# Patient Record
Sex: FEMALE | Race: WHITE | Employment: PART TIME | URBAN - METROPOLITAN AREA
[De-identification: names, ages, dates, MRNs, and addresses within clinical notes are randomized per-mention and may not be internally consistent; named-entity substitution may affect disease eponyms.]

---

## 2017-01-19 DIAGNOSIS — F98.8 ADD (ATTENTION DEFICIT DISORDER): ICD-10-CM

## 2017-01-19 DIAGNOSIS — B00.1 COLD SORE: ICD-10-CM

## 2017-01-19 RX ORDER — VALACYCLOVIR HYDROCHLORIDE 1 G/1
TABLET, FILM COATED ORAL
Qty: 30 TABLET | Refills: 1 | Status: SHIPPED | OUTPATIENT
Start: 2017-01-19 | End: 2017-03-27 | Stop reason: SDUPTHER

## 2017-01-19 RX ORDER — DEXTROAMPHETAMINE SACCHARATE, AMPHETAMINE ASPARTATE MONOHYDRATE, DEXTROAMPHETAMINE SULFATE AND AMPHETAMINE SULFATE 7.5; 7.5; 7.5; 7.5 MG/1; MG/1; MG/1; MG/1
30 CAPSULE, EXTENDED RELEASE ORAL 2 TIMES DAILY
Qty: 60 CAPSULE | Refills: 0 | Status: SHIPPED | OUTPATIENT
Start: 2017-01-19 | End: 2017-02-20 | Stop reason: SDUPTHER

## 2017-02-20 DIAGNOSIS — F98.8 ADD (ATTENTION DEFICIT DISORDER): ICD-10-CM

## 2017-02-20 RX ORDER — DEXTROAMPHETAMINE SACCHARATE, AMPHETAMINE ASPARTATE MONOHYDRATE, DEXTROAMPHETAMINE SULFATE AND AMPHETAMINE SULFATE 7.5; 7.5; 7.5; 7.5 MG/1; MG/1; MG/1; MG/1
30 CAPSULE, EXTENDED RELEASE ORAL 2 TIMES DAILY
Qty: 60 CAPSULE | Refills: 0 | Status: SHIPPED | OUTPATIENT
Start: 2017-02-20 | End: 2017-03-21 | Stop reason: SDUPTHER

## 2017-03-21 DIAGNOSIS — F98.8 ADD (ATTENTION DEFICIT DISORDER): ICD-10-CM

## 2017-03-21 RX ORDER — DEXTROAMPHETAMINE SACCHARATE, AMPHETAMINE ASPARTATE MONOHYDRATE, DEXTROAMPHETAMINE SULFATE AND AMPHETAMINE SULFATE 7.5; 7.5; 7.5; 7.5 MG/1; MG/1; MG/1; MG/1
30 CAPSULE, EXTENDED RELEASE ORAL 2 TIMES DAILY
Qty: 60 CAPSULE | Refills: 0 | Status: SHIPPED | OUTPATIENT
Start: 2017-03-21 | End: 2017-04-21 | Stop reason: SDUPTHER

## 2017-03-27 ENCOUNTER — OFFICE VISIT (OUTPATIENT)
Dept: FAMILY MEDICINE CLINIC | Age: 37
End: 2017-03-27

## 2017-03-27 VITALS
DIASTOLIC BLOOD PRESSURE: 88 MMHG | RESPIRATION RATE: 16 BRPM | BODY MASS INDEX: 19.98 KG/M2 | SYSTOLIC BLOOD PRESSURE: 140 MMHG | WEIGHT: 105.8 LBS | HEIGHT: 61 IN | OXYGEN SATURATION: 97 % | HEART RATE: 103 BPM

## 2017-03-27 DIAGNOSIS — F41.9 ANXIETY: ICD-10-CM

## 2017-03-27 DIAGNOSIS — I10 ESSENTIAL HYPERTENSION: Primary | ICD-10-CM

## 2017-03-27 DIAGNOSIS — B00.1 COLD SORE: ICD-10-CM

## 2017-03-27 DIAGNOSIS — F98.8 ADD (ATTENTION DEFICIT DISORDER): ICD-10-CM

## 2017-03-27 PROCEDURE — 99213 OFFICE O/P EST LOW 20 MIN: CPT | Performed by: FAMILY MEDICINE

## 2017-03-27 RX ORDER — ALPRAZOLAM 1 MG/1
TABLET ORAL
Qty: 90 TABLET | Refills: 2 | Status: SHIPPED | OUTPATIENT
Start: 2017-03-27 | End: 2017-07-17 | Stop reason: SDUPTHER

## 2017-03-27 RX ORDER — VALACYCLOVIR HYDROCHLORIDE 1 G/1
TABLET, FILM COATED ORAL
Qty: 30 TABLET | Refills: 1 | Status: SHIPPED | OUTPATIENT
Start: 2017-03-27 | End: 2017-07-17 | Stop reason: SDUPTHER

## 2017-03-27 RX ORDER — HYDROCHLOROTHIAZIDE 12.5 MG/1
12.5 TABLET ORAL DAILY
Qty: 30 TABLET | Refills: 3 | Status: SHIPPED | OUTPATIENT
Start: 2017-03-27 | End: 2017-07-25 | Stop reason: SDUPTHER

## 2017-03-27 ASSESSMENT — ENCOUNTER SYMPTOMS
RESPIRATORY NEGATIVE: 1
GASTROINTESTINAL NEGATIVE: 1

## 2017-03-30 DIAGNOSIS — I10 ESSENTIAL HYPERTENSION: ICD-10-CM

## 2017-03-30 LAB
A/G RATIO: 1.7 (ref 1.1–2.2)
ALBUMIN SERPL-MCNC: 4.4 G/DL (ref 3.4–5)
ALP BLD-CCNC: 42 U/L (ref 40–129)
ALT SERPL-CCNC: 13 U/L (ref 10–40)
ANION GAP SERPL CALCULATED.3IONS-SCNC: 13 MMOL/L (ref 3–16)
AST SERPL-CCNC: 17 U/L (ref 15–37)
BASOPHILS ABSOLUTE: 0.1 K/UL (ref 0–0.2)
BASOPHILS RELATIVE PERCENT: 0.9 %
BILIRUB SERPL-MCNC: 0.5 MG/DL (ref 0–1)
BUN BLDV-MCNC: 28 MG/DL (ref 7–20)
CALCIUM SERPL-MCNC: 11 MG/DL (ref 8.3–10.6)
CHLORIDE BLD-SCNC: 98 MMOL/L (ref 99–110)
CHOLESTEROL, TOTAL: 176 MG/DL (ref 0–199)
CO2: 23 MMOL/L (ref 21–32)
CREAT SERPL-MCNC: 0.7 MG/DL (ref 0.6–1.1)
EOSINOPHILS ABSOLUTE: 0.2 K/UL (ref 0–0.6)
EOSINOPHILS RELATIVE PERCENT: 3.5 %
GFR AFRICAN AMERICAN: >60
GFR NON-AFRICAN AMERICAN: >60
GLOBULIN: 2.6 G/DL
GLUCOSE BLD-MCNC: 91 MG/DL (ref 70–99)
HCT VFR BLD CALC: 42.1 % (ref 36–48)
HDLC SERPL-MCNC: 109 MG/DL (ref 40–60)
HEMOGLOBIN: 13.9 G/DL (ref 12–16)
LDL CHOLESTEROL CALCULATED: 55 MG/DL
LYMPHOCYTES ABSOLUTE: 2 K/UL (ref 1–5.1)
LYMPHOCYTES RELATIVE PERCENT: 29.8 %
MCH RBC QN AUTO: 32.6 PG (ref 26–34)
MCHC RBC AUTO-ENTMCNC: 33 G/DL (ref 31–36)
MCV RBC AUTO: 98.8 FL (ref 80–100)
MONOCYTES ABSOLUTE: 0.8 K/UL (ref 0–1.3)
MONOCYTES RELATIVE PERCENT: 11.3 %
NEUTROPHILS ABSOLUTE: 3.6 K/UL (ref 1.7–7.7)
NEUTROPHILS RELATIVE PERCENT: 54.5 %
PDW BLD-RTO: 13.7 % (ref 12.4–15.4)
PLATELET # BLD: 252 K/UL (ref 135–450)
PMV BLD AUTO: 9.2 FL (ref 5–10.5)
POTASSIUM SERPL-SCNC: 4.6 MMOL/L (ref 3.5–5.1)
RBC # BLD: 4.26 M/UL (ref 4–5.2)
SODIUM BLD-SCNC: 134 MMOL/L (ref 136–145)
TOTAL PROTEIN: 7 G/DL (ref 6.4–8.2)
TRIGL SERPL-MCNC: 58 MG/DL (ref 0–150)
VLDLC SERPL CALC-MCNC: 12 MG/DL
WBC # BLD: 6.7 K/UL (ref 4–11)

## 2017-04-21 DIAGNOSIS — F98.8 ADD (ATTENTION DEFICIT DISORDER): ICD-10-CM

## 2017-04-21 RX ORDER — DEXTROAMPHETAMINE SACCHARATE, AMPHETAMINE ASPARTATE MONOHYDRATE, DEXTROAMPHETAMINE SULFATE AND AMPHETAMINE SULFATE 7.5; 7.5; 7.5; 7.5 MG/1; MG/1; MG/1; MG/1
30 CAPSULE, EXTENDED RELEASE ORAL 2 TIMES DAILY
Qty: 60 CAPSULE | Refills: 0 | Status: SHIPPED | OUTPATIENT
Start: 2017-04-21 | End: 2017-05-22 | Stop reason: SDUPTHER

## 2017-05-22 DIAGNOSIS — F98.8 ADD (ATTENTION DEFICIT DISORDER): ICD-10-CM

## 2017-05-22 RX ORDER — DEXTROAMPHETAMINE SACCHARATE, AMPHETAMINE ASPARTATE MONOHYDRATE, DEXTROAMPHETAMINE SULFATE AND AMPHETAMINE SULFATE 7.5; 7.5; 7.5; 7.5 MG/1; MG/1; MG/1; MG/1
30 CAPSULE, EXTENDED RELEASE ORAL 2 TIMES DAILY
Qty: 60 CAPSULE | Refills: 0 | Status: SHIPPED | OUTPATIENT
Start: 2017-05-22 | End: 2017-06-21 | Stop reason: SDUPTHER

## 2017-06-21 DIAGNOSIS — F98.8 ADD (ATTENTION DEFICIT DISORDER): ICD-10-CM

## 2017-06-21 RX ORDER — DEXTROAMPHETAMINE SACCHARATE, AMPHETAMINE ASPARTATE MONOHYDRATE, DEXTROAMPHETAMINE SULFATE AND AMPHETAMINE SULFATE 7.5; 7.5; 7.5; 7.5 MG/1; MG/1; MG/1; MG/1
30 CAPSULE, EXTENDED RELEASE ORAL 2 TIMES DAILY
Qty: 60 CAPSULE | Refills: 0 | Status: SHIPPED | OUTPATIENT
Start: 2017-06-21 | End: 2017-07-17 | Stop reason: SDUPTHER

## 2017-07-17 ENCOUNTER — OFFICE VISIT (OUTPATIENT)
Dept: FAMILY MEDICINE CLINIC | Age: 37
End: 2017-07-17

## 2017-07-17 VITALS
RESPIRATION RATE: 16 BRPM | DIASTOLIC BLOOD PRESSURE: 102 MMHG | WEIGHT: 106.4 LBS | OXYGEN SATURATION: 99 % | SYSTOLIC BLOOD PRESSURE: 166 MMHG | HEART RATE: 96 BPM | HEIGHT: 61 IN | BODY MASS INDEX: 20.09 KG/M2

## 2017-07-17 DIAGNOSIS — I10 ESSENTIAL HYPERTENSION: ICD-10-CM

## 2017-07-17 DIAGNOSIS — F98.8 ADD (ATTENTION DEFICIT DISORDER): ICD-10-CM

## 2017-07-17 DIAGNOSIS — B00.1 COLD SORE: ICD-10-CM

## 2017-07-17 DIAGNOSIS — F41.9 ANXIETY: Primary | ICD-10-CM

## 2017-07-17 PROCEDURE — 99214 OFFICE O/P EST MOD 30 MIN: CPT | Performed by: FAMILY MEDICINE

## 2017-07-17 RX ORDER — DEXTROAMPHETAMINE SACCHARATE, AMPHETAMINE ASPARTATE MONOHYDRATE, DEXTROAMPHETAMINE SULFATE AND AMPHETAMINE SULFATE 7.5; 7.5; 7.5; 7.5 MG/1; MG/1; MG/1; MG/1
30 CAPSULE, EXTENDED RELEASE ORAL 2 TIMES DAILY
Qty: 60 CAPSULE | Refills: 0 | Status: SHIPPED | OUTPATIENT
Start: 2017-07-17 | End: 2017-08-21 | Stop reason: SDUPTHER

## 2017-07-17 RX ORDER — VALACYCLOVIR HYDROCHLORIDE 1 G/1
TABLET, FILM COATED ORAL
Qty: 30 TABLET | Refills: 1 | Status: SHIPPED | OUTPATIENT
Start: 2017-07-17 | End: 2017-09-18 | Stop reason: SDUPTHER

## 2017-07-17 RX ORDER — ALPRAZOLAM 1 MG/1
TABLET ORAL
Qty: 90 TABLET | Refills: 0 | Status: SHIPPED | OUTPATIENT
Start: 2017-07-17 | End: 2017-09-18 | Stop reason: SDUPTHER

## 2017-07-17 RX ORDER — ESCITALOPRAM OXALATE 5 MG/1
5 TABLET ORAL DAILY
Qty: 30 TABLET | Refills: 3 | Status: SHIPPED | OUTPATIENT
Start: 2017-07-17 | End: 2017-09-18

## 2017-07-17 ASSESSMENT — PATIENT HEALTH QUESTIONNAIRE - PHQ9
2. FEELING DOWN, DEPRESSED OR HOPELESS: 1
1. LITTLE INTEREST OR PLEASURE IN DOING THINGS: 1
SUM OF ALL RESPONSES TO PHQ9 QUESTIONS 1 & 2: 2
SUM OF ALL RESPONSES TO PHQ QUESTIONS 1-9: 2

## 2017-07-25 DIAGNOSIS — I10 ESSENTIAL HYPERTENSION: ICD-10-CM

## 2017-07-25 RX ORDER — HYDROCHLOROTHIAZIDE 12.5 MG/1
TABLET ORAL
Qty: 30 TABLET | Refills: 3 | Status: SHIPPED | OUTPATIENT
Start: 2017-07-25 | End: 2017-12-11 | Stop reason: ALTCHOICE

## 2017-08-21 DIAGNOSIS — F98.8 ADD (ATTENTION DEFICIT DISORDER): ICD-10-CM

## 2017-08-22 RX ORDER — DEXTROAMPHETAMINE SACCHARATE, AMPHETAMINE ASPARTATE MONOHYDRATE, DEXTROAMPHETAMINE SULFATE AND AMPHETAMINE SULFATE 7.5; 7.5; 7.5; 7.5 MG/1; MG/1; MG/1; MG/1
30 CAPSULE, EXTENDED RELEASE ORAL 2 TIMES DAILY
Qty: 60 CAPSULE | Refills: 0 | Status: SHIPPED | OUTPATIENT
Start: 2017-08-22 | End: 2017-09-18 | Stop reason: SDUPTHER

## 2017-09-18 ENCOUNTER — OFFICE VISIT (OUTPATIENT)
Dept: FAMILY MEDICINE CLINIC | Age: 37
End: 2017-09-18

## 2017-09-18 VITALS
HEART RATE: 102 BPM | OXYGEN SATURATION: 97 % | BODY MASS INDEX: 20.44 KG/M2 | WEIGHT: 108.2 LBS | SYSTOLIC BLOOD PRESSURE: 140 MMHG | DIASTOLIC BLOOD PRESSURE: 98 MMHG

## 2017-09-18 DIAGNOSIS — B00.1 COLD SORE: ICD-10-CM

## 2017-09-18 DIAGNOSIS — F98.8 ADD (ATTENTION DEFICIT DISORDER): ICD-10-CM

## 2017-09-18 DIAGNOSIS — I10 ESSENTIAL HYPERTENSION: Primary | ICD-10-CM

## 2017-09-18 DIAGNOSIS — F41.9 ANXIETY: ICD-10-CM

## 2017-09-18 PROCEDURE — 99214 OFFICE O/P EST MOD 30 MIN: CPT | Performed by: FAMILY MEDICINE

## 2017-09-18 RX ORDER — VALACYCLOVIR HYDROCHLORIDE 1 G/1
TABLET, FILM COATED ORAL
Qty: 30 TABLET | Refills: 1 | Status: SHIPPED | OUTPATIENT
Start: 2017-09-18 | End: 2017-12-11 | Stop reason: SDUPTHER

## 2017-09-18 RX ORDER — DEXTROAMPHETAMINE SACCHARATE, AMPHETAMINE ASPARTATE MONOHYDRATE, DEXTROAMPHETAMINE SULFATE AND AMPHETAMINE SULFATE 7.5; 7.5; 7.5; 7.5 MG/1; MG/1; MG/1; MG/1
30 CAPSULE, EXTENDED RELEASE ORAL 2 TIMES DAILY
Qty: 60 CAPSULE | Refills: 0 | Status: SHIPPED | OUTPATIENT
Start: 2017-09-18 | End: 2017-10-16 | Stop reason: SDUPTHER

## 2017-09-18 RX ORDER — VENLAFAXINE HYDROCHLORIDE 75 MG/1
75 CAPSULE, EXTENDED RELEASE ORAL DAILY
Qty: 30 CAPSULE | Refills: 3 | Status: SHIPPED | OUTPATIENT
Start: 2017-09-18 | End: 2017-11-13 | Stop reason: SDUPTHER

## 2017-09-18 RX ORDER — AMLODIPINE BESYLATE 5 MG/1
5 TABLET ORAL DAILY
Qty: 30 TABLET | Refills: 3 | Status: SHIPPED | OUTPATIENT
Start: 2017-09-18 | End: 2017-12-11 | Stop reason: SDUPTHER

## 2017-09-18 RX ORDER — ALPRAZOLAM 1 MG/1
TABLET ORAL
Qty: 90 TABLET | Refills: 0 | Status: SHIPPED | OUTPATIENT
Start: 2017-09-18 | End: 2017-10-16 | Stop reason: SDUPTHER

## 2017-09-18 ASSESSMENT — ENCOUNTER SYMPTOMS
GASTROINTESTINAL NEGATIVE: 1
RESPIRATORY NEGATIVE: 1

## 2017-10-16 DIAGNOSIS — F41.9 ANXIETY: ICD-10-CM

## 2017-10-16 DIAGNOSIS — F98.8 ATTENTION DEFICIT DISORDER, UNSPECIFIED HYPERACTIVITY PRESENCE: ICD-10-CM

## 2017-10-16 RX ORDER — DEXTROAMPHETAMINE SACCHARATE, AMPHETAMINE ASPARTATE MONOHYDRATE, DEXTROAMPHETAMINE SULFATE AND AMPHETAMINE SULFATE 7.5; 7.5; 7.5; 7.5 MG/1; MG/1; MG/1; MG/1
30 CAPSULE, EXTENDED RELEASE ORAL 2 TIMES DAILY
Qty: 60 CAPSULE | Refills: 0 | Status: SHIPPED | OUTPATIENT
Start: 2017-10-16 | End: 2017-11-20 | Stop reason: SDUPTHER

## 2017-10-16 RX ORDER — ALPRAZOLAM 1 MG/1
TABLET ORAL
Qty: 90 TABLET | Refills: 0 | Status: SHIPPED | OUTPATIENT
Start: 2017-10-16 | End: 2017-12-11 | Stop reason: SDUPTHER

## 2017-10-25 ENCOUNTER — TELEPHONE (OUTPATIENT)
Dept: FAMILY MEDICINE CLINIC | Age: 37
End: 2017-10-25

## 2017-10-25 NOTE — TELEPHONE ENCOUNTER
Patient was prescribed [facility] ER tabs and told to call back if they don't help her. She  stated they are not helping. She would like to know if you would like to increase the dose or wait to her next appointment?  Please advise

## 2017-10-26 NOTE — TELEPHONE ENCOUNTER
LM asking patient to call the office back. If patient calls back please refer to Dr. Felix Mom message below.

## 2017-11-13 DIAGNOSIS — F41.9 ANXIETY: ICD-10-CM

## 2017-11-13 RX ORDER — VENLAFAXINE HYDROCHLORIDE 75 MG/1
75 CAPSULE, EXTENDED RELEASE ORAL DAILY
Qty: 30 CAPSULE | Refills: 5 | Status: SHIPPED | OUTPATIENT
Start: 2017-11-13 | End: 2017-11-21 | Stop reason: SDUPTHER

## 2017-11-20 DIAGNOSIS — F98.8 ATTENTION DEFICIT DISORDER, UNSPECIFIED HYPERACTIVITY PRESENCE: ICD-10-CM

## 2017-11-20 RX ORDER — DEXTROAMPHETAMINE SACCHARATE, AMPHETAMINE ASPARTATE MONOHYDRATE, DEXTROAMPHETAMINE SULFATE AND AMPHETAMINE SULFATE 7.5; 7.5; 7.5; 7.5 MG/1; MG/1; MG/1; MG/1
30 CAPSULE, EXTENDED RELEASE ORAL 2 TIMES DAILY
Qty: 60 CAPSULE | Refills: 0 | Status: SHIPPED | OUTPATIENT
Start: 2017-11-20 | End: 2017-12-11 | Stop reason: SDUPTHER

## 2017-11-20 NOTE — TELEPHONE ENCOUNTER
Medication and Quantity requested: amphetamine-dextroamphetamine (ADDERALL XR) 30 MG extended release capsule  Qty 60     Last Visit  9/18/17    Pharmacy and phone number updated in EPIC:  yes

## 2017-11-21 DIAGNOSIS — F41.9 ANXIETY: ICD-10-CM

## 2017-11-21 RX ORDER — VENLAFAXINE HYDROCHLORIDE 75 MG/1
150 CAPSULE, EXTENDED RELEASE ORAL DAILY
Qty: 60 CAPSULE | Refills: 5 | Status: SHIPPED | OUTPATIENT
Start: 2017-11-21 | End: 2018-05-22 | Stop reason: SDUPTHER

## 2017-11-21 NOTE — TELEPHONE ENCOUNTER
Medication and Quantity requested: Effexor 75 MG Taking Two by mouth daily per patient Dr. Maricel Hall changed her dosage and she is now out     Last Visit  9/18/17    Pharmacy and phone number updated in [facility]:  yes

## 2017-12-11 ENCOUNTER — OFFICE VISIT (OUTPATIENT)
Dept: FAMILY MEDICINE CLINIC | Age: 37
End: 2017-12-11

## 2017-12-11 VITALS
RESPIRATION RATE: 16 BRPM | HEART RATE: 104 BPM | WEIGHT: 111.8 LBS | BODY MASS INDEX: 21.11 KG/M2 | HEIGHT: 61 IN | SYSTOLIC BLOOD PRESSURE: 134 MMHG | OXYGEN SATURATION: 98 % | DIASTOLIC BLOOD PRESSURE: 94 MMHG

## 2017-12-11 DIAGNOSIS — F98.8 ATTENTION DEFICIT DISORDER, UNSPECIFIED HYPERACTIVITY PRESENCE: ICD-10-CM

## 2017-12-11 DIAGNOSIS — Z00.00 WELL ADULT EXAM: Primary | ICD-10-CM

## 2017-12-11 DIAGNOSIS — Z11.4 ENCOUNTER FOR SCREENING FOR HIV: ICD-10-CM

## 2017-12-11 DIAGNOSIS — I10 ESSENTIAL HYPERTENSION: ICD-10-CM

## 2017-12-11 DIAGNOSIS — N94.6 MENSTRUAL CRAMPS: ICD-10-CM

## 2017-12-11 DIAGNOSIS — B00.1 COLD SORE: ICD-10-CM

## 2017-12-11 DIAGNOSIS — Z23 NEED FOR TDAP VACCINATION: ICD-10-CM

## 2017-12-11 DIAGNOSIS — F41.9 ANXIETY: ICD-10-CM

## 2017-12-11 PROCEDURE — 99213 OFFICE O/P EST LOW 20 MIN: CPT | Performed by: FAMILY MEDICINE

## 2017-12-11 PROCEDURE — 90471 IMMUNIZATION ADMIN: CPT | Performed by: FAMILY MEDICINE

## 2017-12-11 PROCEDURE — 90715 TDAP VACCINE 7 YRS/> IM: CPT | Performed by: FAMILY MEDICINE

## 2017-12-11 RX ORDER — AMLODIPINE BESYLATE 5 MG/1
5 TABLET ORAL DAILY
Qty: 30 TABLET | Refills: 11 | Status: SHIPPED | OUTPATIENT
Start: 2017-12-11 | End: 2018-12-19 | Stop reason: SDUPTHER

## 2017-12-11 RX ORDER — IBUPROFEN 800 MG/1
TABLET ORAL
Qty: 30 TABLET | Refills: 3 | Status: SHIPPED | OUTPATIENT
Start: 2017-12-11 | End: 2018-12-28 | Stop reason: SDUPTHER

## 2017-12-11 RX ORDER — VALACYCLOVIR HYDROCHLORIDE 1 G/1
TABLET, FILM COATED ORAL
Qty: 30 TABLET | Refills: 2 | Status: SHIPPED | OUTPATIENT
Start: 2017-12-11 | End: 2018-08-27 | Stop reason: SDUPTHER

## 2017-12-11 RX ORDER — DEXTROAMPHETAMINE SACCHARATE, AMPHETAMINE ASPARTATE MONOHYDRATE, DEXTROAMPHETAMINE SULFATE AND AMPHETAMINE SULFATE 7.5; 7.5; 7.5; 7.5 MG/1; MG/1; MG/1; MG/1
30 CAPSULE, EXTENDED RELEASE ORAL 2 TIMES DAILY
Qty: 60 CAPSULE | Refills: 0 | Status: SHIPPED | OUTPATIENT
Start: 2017-12-11 | End: 2018-01-15 | Stop reason: SDUPTHER

## 2017-12-11 RX ORDER — ALPRAZOLAM 1 MG/1
TABLET ORAL
Qty: 90 TABLET | Refills: 0 | Status: SHIPPED | OUTPATIENT
Start: 2017-12-11 | End: 2018-03-29 | Stop reason: SDUPTHER

## 2017-12-11 RX ORDER — HYDROCHLOROTHIAZIDE 12.5 MG/1
12.5 TABLET ORAL DAILY
Qty: 30 TABLET | Refills: 3 | Status: SHIPPED | OUTPATIENT
Start: 2017-12-11 | End: 2018-04-21 | Stop reason: SDUPTHER

## 2017-12-11 ASSESSMENT — ENCOUNTER SYMPTOMS
RESPIRATORY NEGATIVE: 1
GASTROINTESTINAL NEGATIVE: 1

## 2018-01-15 DIAGNOSIS — F98.8 ATTENTION DEFICIT DISORDER, UNSPECIFIED HYPERACTIVITY PRESENCE: ICD-10-CM

## 2018-01-15 RX ORDER — DEXTROAMPHETAMINE SACCHARATE, AMPHETAMINE ASPARTATE MONOHYDRATE, DEXTROAMPHETAMINE SULFATE AND AMPHETAMINE SULFATE 7.5; 7.5; 7.5; 7.5 MG/1; MG/1; MG/1; MG/1
30 CAPSULE, EXTENDED RELEASE ORAL 2 TIMES DAILY
Qty: 60 CAPSULE | Refills: 0 | Status: SHIPPED | OUTPATIENT
Start: 2018-01-15 | End: 2018-02-15 | Stop reason: SDUPTHER

## 2018-02-15 DIAGNOSIS — F98.8 ATTENTION DEFICIT DISORDER, UNSPECIFIED HYPERACTIVITY PRESENCE: ICD-10-CM

## 2018-02-15 RX ORDER — DEXTROAMPHETAMINE SACCHARATE, AMPHETAMINE ASPARTATE MONOHYDRATE, DEXTROAMPHETAMINE SULFATE AND AMPHETAMINE SULFATE 7.5; 7.5; 7.5; 7.5 MG/1; MG/1; MG/1; MG/1
30 CAPSULE, EXTENDED RELEASE ORAL 2 TIMES DAILY
Qty: 60 CAPSULE | Refills: 0 | Status: SHIPPED | OUTPATIENT
Start: 2018-02-15 | End: 2018-03-15 | Stop reason: SDUPTHER

## 2018-03-15 DIAGNOSIS — F98.8 ATTENTION DEFICIT DISORDER, UNSPECIFIED HYPERACTIVITY PRESENCE: ICD-10-CM

## 2018-03-15 RX ORDER — DEXTROAMPHETAMINE SACCHARATE, AMPHETAMINE ASPARTATE MONOHYDRATE, DEXTROAMPHETAMINE SULFATE AND AMPHETAMINE SULFATE 7.5; 7.5; 7.5; 7.5 MG/1; MG/1; MG/1; MG/1
30 CAPSULE, EXTENDED RELEASE ORAL 2 TIMES DAILY
Qty: 60 CAPSULE | Refills: 0 | Status: SHIPPED | OUTPATIENT
Start: 2018-03-15 | End: 2018-04-23 | Stop reason: SDUPTHER

## 2018-03-29 ENCOUNTER — OFFICE VISIT (OUTPATIENT)
Dept: FAMILY MEDICINE CLINIC | Age: 38
End: 2018-03-29

## 2018-03-29 VITALS
HEART RATE: 110 BPM | RESPIRATION RATE: 14 BRPM | BODY MASS INDEX: 21.52 KG/M2 | SYSTOLIC BLOOD PRESSURE: 170 MMHG | HEIGHT: 61 IN | WEIGHT: 114 LBS | DIASTOLIC BLOOD PRESSURE: 102 MMHG | OXYGEN SATURATION: 98 %

## 2018-03-29 DIAGNOSIS — F98.8 ATTENTION DEFICIT DISORDER, UNSPECIFIED HYPERACTIVITY PRESENCE: ICD-10-CM

## 2018-03-29 DIAGNOSIS — F41.9 ANXIETY: ICD-10-CM

## 2018-03-29 DIAGNOSIS — I10 ESSENTIAL HYPERTENSION: Primary | ICD-10-CM

## 2018-03-29 PROCEDURE — 99213 OFFICE O/P EST LOW 20 MIN: CPT | Performed by: FAMILY MEDICINE

## 2018-03-29 RX ORDER — METOPROLOL SUCCINATE 25 MG/1
25 TABLET, EXTENDED RELEASE ORAL DAILY
Qty: 30 TABLET | Refills: 3 | Status: SHIPPED | OUTPATIENT
Start: 2018-03-29 | End: 2018-07-23 | Stop reason: SDUPTHER

## 2018-03-29 RX ORDER — ALPRAZOLAM 1 MG/1
TABLET ORAL
Qty: 90 TABLET | Refills: 0 | Status: SHIPPED | OUTPATIENT
Start: 2018-03-29 | End: 2018-08-27 | Stop reason: SDUPTHER

## 2018-03-29 ASSESSMENT — ENCOUNTER SYMPTOMS
RESPIRATORY NEGATIVE: 1
GASTROINTESTINAL NEGATIVE: 1

## 2018-03-29 NOTE — PROGRESS NOTES
here to calibrate. She should return in one month. I did advise her that metoprolol could cause some sluggishness. Anxiety  -     ALPRAZolam (XANAX) 1 MG tablet; TAKE ONE TABLET BY MOUTH THREE TIMES A DAY AS NEEDED FOR ANXIETY.   OARRS report reviewed and no inconsistencies noted   The patient understands the risks of dependency/addiction with alprazolam and will take as little as possible and discontinue as soon as possible       Attention deficit disorder, unspecified hyperactivity presence  OARRS report reviewed and no inconsistencies noted   The patient understands the risks of dependency/addiction with Adderall and will take as little as possible and discontinue as soon as possible

## 2018-04-21 DIAGNOSIS — I10 ESSENTIAL HYPERTENSION: ICD-10-CM

## 2018-04-23 DIAGNOSIS — F98.8 ATTENTION DEFICIT DISORDER, UNSPECIFIED HYPERACTIVITY PRESENCE: ICD-10-CM

## 2018-04-23 RX ORDER — HYDROCHLOROTHIAZIDE 12.5 MG/1
TABLET ORAL
Qty: 30 TABLET | Refills: 10 | Status: SHIPPED | OUTPATIENT
Start: 2018-04-23 | End: 2019-03-14 | Stop reason: SDUPTHER

## 2018-04-23 RX ORDER — DEXTROAMPHETAMINE SACCHARATE, AMPHETAMINE ASPARTATE MONOHYDRATE, DEXTROAMPHETAMINE SULFATE AND AMPHETAMINE SULFATE 7.5; 7.5; 7.5; 7.5 MG/1; MG/1; MG/1; MG/1
30 CAPSULE, EXTENDED RELEASE ORAL 2 TIMES DAILY
Qty: 60 CAPSULE | Refills: 0 | Status: SHIPPED | OUTPATIENT
Start: 2018-04-23 | End: 2018-05-21 | Stop reason: SDUPTHER

## 2018-05-21 DIAGNOSIS — F98.8 ATTENTION DEFICIT DISORDER, UNSPECIFIED HYPERACTIVITY PRESENCE: ICD-10-CM

## 2018-05-21 RX ORDER — DEXTROAMPHETAMINE SACCHARATE, AMPHETAMINE ASPARTATE MONOHYDRATE, DEXTROAMPHETAMINE SULFATE AND AMPHETAMINE SULFATE 7.5; 7.5; 7.5; 7.5 MG/1; MG/1; MG/1; MG/1
30 CAPSULE, EXTENDED RELEASE ORAL 2 TIMES DAILY
Qty: 60 CAPSULE | Refills: 0 | Status: SHIPPED | OUTPATIENT
Start: 2018-05-21 | End: 2018-06-25 | Stop reason: SDUPTHER

## 2018-05-22 DIAGNOSIS — F41.9 ANXIETY: ICD-10-CM

## 2018-05-23 RX ORDER — VENLAFAXINE HYDROCHLORIDE 75 MG/1
CAPSULE, EXTENDED RELEASE ORAL
Qty: 60 CAPSULE | Refills: 4 | Status: SHIPPED | OUTPATIENT
Start: 2018-05-23 | End: 2018-10-23 | Stop reason: SDUPTHER

## 2018-06-25 DIAGNOSIS — F98.8 ATTENTION DEFICIT DISORDER, UNSPECIFIED HYPERACTIVITY PRESENCE: ICD-10-CM

## 2018-06-25 RX ORDER — DEXTROAMPHETAMINE SACCHARATE, AMPHETAMINE ASPARTATE MONOHYDRATE, DEXTROAMPHETAMINE SULFATE AND AMPHETAMINE SULFATE 7.5; 7.5; 7.5; 7.5 MG/1; MG/1; MG/1; MG/1
30 CAPSULE, EXTENDED RELEASE ORAL 2 TIMES DAILY
Qty: 60 CAPSULE | Refills: 0 | Status: SHIPPED | OUTPATIENT
Start: 2018-06-25 | End: 2018-07-24 | Stop reason: SDUPTHER

## 2018-07-23 DIAGNOSIS — I10 ESSENTIAL HYPERTENSION: ICD-10-CM

## 2018-07-23 RX ORDER — METOPROLOL SUCCINATE 25 MG/1
TABLET, EXTENDED RELEASE ORAL
Qty: 90 TABLET | Refills: 2 | Status: SHIPPED | OUTPATIENT
Start: 2018-07-23 | End: 2019-04-17 | Stop reason: SDUPTHER

## 2018-07-24 DIAGNOSIS — F98.8 ATTENTION DEFICIT DISORDER, UNSPECIFIED HYPERACTIVITY PRESENCE: ICD-10-CM

## 2018-07-24 DIAGNOSIS — I10 ESSENTIAL HYPERTENSION: ICD-10-CM

## 2018-07-24 RX ORDER — DEXTROAMPHETAMINE SACCHARATE, AMPHETAMINE ASPARTATE MONOHYDRATE, DEXTROAMPHETAMINE SULFATE AND AMPHETAMINE SULFATE 7.5; 7.5; 7.5; 7.5 MG/1; MG/1; MG/1; MG/1
30 CAPSULE, EXTENDED RELEASE ORAL 2 TIMES DAILY
Qty: 60 CAPSULE | Refills: 0 | Status: SHIPPED | OUTPATIENT
Start: 2018-07-24 | End: 2018-08-24 | Stop reason: SDUPTHER

## 2018-07-24 RX ORDER — METOPROLOL SUCCINATE 25 MG/1
TABLET, EXTENDED RELEASE ORAL
Qty: 90 TABLET | Refills: 2 | OUTPATIENT
Start: 2018-07-24

## 2018-08-24 DIAGNOSIS — F98.8 ATTENTION DEFICIT DISORDER, UNSPECIFIED HYPERACTIVITY PRESENCE: ICD-10-CM

## 2018-08-24 RX ORDER — DEXTROAMPHETAMINE SACCHARATE, AMPHETAMINE ASPARTATE MONOHYDRATE, DEXTROAMPHETAMINE SULFATE AND AMPHETAMINE SULFATE 7.5; 7.5; 7.5; 7.5 MG/1; MG/1; MG/1; MG/1
30 CAPSULE, EXTENDED RELEASE ORAL 2 TIMES DAILY
Qty: 60 CAPSULE | Refills: 0 | Status: SHIPPED | OUTPATIENT
Start: 2018-08-24 | End: 2018-09-24 | Stop reason: SDUPTHER

## 2018-08-27 ENCOUNTER — OFFICE VISIT (OUTPATIENT)
Dept: FAMILY MEDICINE CLINIC | Age: 38
End: 2018-08-27

## 2018-08-27 VITALS
SYSTOLIC BLOOD PRESSURE: 124 MMHG | WEIGHT: 109.4 LBS | HEART RATE: 91 BPM | DIASTOLIC BLOOD PRESSURE: 86 MMHG | BODY MASS INDEX: 20.67 KG/M2 | OXYGEN SATURATION: 99 %

## 2018-08-27 DIAGNOSIS — Z00.00 WELL ADULT EXAM: ICD-10-CM

## 2018-08-27 DIAGNOSIS — Z11.4 SCREENING FOR HIV WITHOUT PRESENCE OF RISK FACTORS: ICD-10-CM

## 2018-08-27 DIAGNOSIS — I10 ESSENTIAL HYPERTENSION: Primary | ICD-10-CM

## 2018-08-27 DIAGNOSIS — F98.8 ATTENTION DEFICIT DISORDER (ADD) WITHOUT HYPERACTIVITY: ICD-10-CM

## 2018-08-27 DIAGNOSIS — I10 ESSENTIAL HYPERTENSION: ICD-10-CM

## 2018-08-27 DIAGNOSIS — B00.1 COLD SORE: ICD-10-CM

## 2018-08-27 DIAGNOSIS — F41.9 ANXIETY: ICD-10-CM

## 2018-08-27 LAB
A/G RATIO: 1.5 (ref 1.1–2.2)
ALBUMIN SERPL-MCNC: 4.7 G/DL (ref 3.4–5)
ALP BLD-CCNC: 38 U/L (ref 40–129)
ALT SERPL-CCNC: 18 U/L (ref 10–40)
ANION GAP SERPL CALCULATED.3IONS-SCNC: 15 MMOL/L (ref 3–16)
AST SERPL-CCNC: 33 U/L (ref 15–37)
BASOPHILS ABSOLUTE: 0.1 K/UL (ref 0–0.2)
BASOPHILS RELATIVE PERCENT: 1.2 %
BILIRUB SERPL-MCNC: 0.3 MG/DL (ref 0–1)
BUN BLDV-MCNC: 17 MG/DL (ref 7–20)
CALCIUM SERPL-MCNC: 10.4 MG/DL (ref 8.3–10.6)
CHLORIDE BLD-SCNC: 98 MMOL/L (ref 99–110)
CHOLESTEROL, TOTAL: 206 MG/DL (ref 0–199)
CO2: 25 MMOL/L (ref 21–32)
CREAT SERPL-MCNC: 0.7 MG/DL (ref 0.6–1.1)
EOSINOPHILS ABSOLUTE: 0.3 K/UL (ref 0–0.6)
EOSINOPHILS RELATIVE PERCENT: 4.5 %
GFR AFRICAN AMERICAN: >60
GFR NON-AFRICAN AMERICAN: >60
GLOBULIN: 3.1 G/DL
GLUCOSE BLD-MCNC: 85 MG/DL (ref 70–99)
HCT VFR BLD CALC: 44.7 % (ref 36–48)
HDLC SERPL-MCNC: 120 MG/DL (ref 40–60)
HEMOGLOBIN: 14.7 G/DL (ref 12–16)
LDL CHOLESTEROL CALCULATED: 65 MG/DL
LYMPHOCYTES ABSOLUTE: 1.6 K/UL (ref 1–5.1)
LYMPHOCYTES RELATIVE PERCENT: 25.3 %
MCH RBC QN AUTO: 33.4 PG (ref 26–34)
MCHC RBC AUTO-ENTMCNC: 32.9 G/DL (ref 31–36)
MCV RBC AUTO: 101.4 FL (ref 80–100)
MONOCYTES ABSOLUTE: 0.8 K/UL (ref 0–1.3)
MONOCYTES RELATIVE PERCENT: 12.1 %
NEUTROPHILS ABSOLUTE: 3.5 K/UL (ref 1.7–7.7)
NEUTROPHILS RELATIVE PERCENT: 56.9 %
PDW BLD-RTO: 13.7 % (ref 12.4–15.4)
PLATELET # BLD: 266 K/UL (ref 135–450)
PMV BLD AUTO: 8.7 FL (ref 5–10.5)
POTASSIUM SERPL-SCNC: 5.1 MMOL/L (ref 3.5–5.1)
RBC # BLD: 4.41 M/UL (ref 4–5.2)
SODIUM BLD-SCNC: 138 MMOL/L (ref 136–145)
T4 FREE: 0.8 NG/DL (ref 0.9–1.8)
TOTAL PROTEIN: 7.8 G/DL (ref 6.4–8.2)
TRIGL SERPL-MCNC: 103 MG/DL (ref 0–150)
TSH REFLEX: 4.68 UIU/ML (ref 0.27–4.2)
VLDLC SERPL CALC-MCNC: 21 MG/DL
WBC # BLD: 6.2 K/UL (ref 4–11)

## 2018-08-27 PROCEDURE — 99213 OFFICE O/P EST LOW 20 MIN: CPT | Performed by: FAMILY MEDICINE

## 2018-08-27 RX ORDER — VALACYCLOVIR HYDROCHLORIDE 1 G/1
TABLET, FILM COATED ORAL
Qty: 30 TABLET | Refills: 1 | Status: SHIPPED | OUTPATIENT
Start: 2018-08-27 | End: 2019-01-21 | Stop reason: SDUPTHER

## 2018-08-27 RX ORDER — ALPRAZOLAM 1 MG/1
TABLET ORAL
Qty: 90 TABLET | Refills: 0 | Status: SHIPPED | OUTPATIENT
Start: 2018-08-27 | End: 2018-11-08 | Stop reason: SDUPTHER

## 2018-08-27 ASSESSMENT — PATIENT HEALTH QUESTIONNAIRE - PHQ9
1. LITTLE INTEREST OR PLEASURE IN DOING THINGS: 0
SUM OF ALL RESPONSES TO PHQ QUESTIONS 1-9: 0
2. FEELING DOWN, DEPRESSED OR HOPELESS: 0
SUM OF ALL RESPONSES TO PHQ9 QUESTIONS 1 & 2: 0
SUM OF ALL RESPONSES TO PHQ QUESTIONS 1-9: 0

## 2018-08-28 LAB
HIV AG/AB: NORMAL
HIV ANTIGEN: NORMAL
HIV-1 ANTIBODY: NORMAL
HIV-2 AB: NORMAL

## 2018-09-07 ENCOUNTER — TELEPHONE (OUTPATIENT)
Dept: FAMILY MEDICINE CLINIC | Age: 38
End: 2018-09-07

## 2018-09-07 RX ORDER — ONDANSETRON 4 MG/1
4 TABLET, FILM COATED ORAL EVERY 6 HOURS PRN
Qty: 30 TABLET | Refills: 1 | Status: SHIPPED | OUTPATIENT
Start: 2018-09-07 | End: 2019-03-14 | Stop reason: SDUPTHER

## 2018-09-24 DIAGNOSIS — F98.8 ATTENTION DEFICIT DISORDER, UNSPECIFIED HYPERACTIVITY PRESENCE: ICD-10-CM

## 2018-09-24 RX ORDER — DEXTROAMPHETAMINE SACCHARATE, AMPHETAMINE ASPARTATE MONOHYDRATE, DEXTROAMPHETAMINE SULFATE AND AMPHETAMINE SULFATE 7.5; 7.5; 7.5; 7.5 MG/1; MG/1; MG/1; MG/1
30 CAPSULE, EXTENDED RELEASE ORAL 2 TIMES DAILY
Qty: 60 CAPSULE | Refills: 0 | Status: SHIPPED | OUTPATIENT
Start: 2018-09-24 | End: 2018-10-22 | Stop reason: SDUPTHER

## 2018-10-22 DIAGNOSIS — F98.8 ATTENTION DEFICIT DISORDER, UNSPECIFIED HYPERACTIVITY PRESENCE: ICD-10-CM

## 2018-10-22 RX ORDER — DEXTROAMPHETAMINE SACCHARATE, AMPHETAMINE ASPARTATE MONOHYDRATE, DEXTROAMPHETAMINE SULFATE AND AMPHETAMINE SULFATE 7.5; 7.5; 7.5; 7.5 MG/1; MG/1; MG/1; MG/1
30 CAPSULE, EXTENDED RELEASE ORAL 2 TIMES DAILY
Qty: 60 CAPSULE | Refills: 0 | Status: SHIPPED | OUTPATIENT
Start: 2018-10-22 | End: 2018-11-20 | Stop reason: SDUPTHER

## 2018-10-22 NOTE — TELEPHONE ENCOUNTER
Medication and Quantity requested: amphetamine-dextroamphetamine (ADDERALL XR) 30 MG extended release capsule          Last Visit  8/27/18    Pharmacy and phone number updated in EPIC:  yes

## 2018-10-23 DIAGNOSIS — F41.9 ANXIETY: ICD-10-CM

## 2018-10-23 RX ORDER — VENLAFAXINE HYDROCHLORIDE 75 MG/1
CAPSULE, EXTENDED RELEASE ORAL
Qty: 60 CAPSULE | Refills: 4 | Status: SHIPPED | OUTPATIENT
Start: 2018-10-23 | End: 2019-03-14 | Stop reason: SDUPTHER

## 2018-10-26 ENCOUNTER — TELEPHONE (OUTPATIENT)
Dept: FAMILY MEDICINE CLINIC | Age: 38
End: 2018-10-26

## 2018-11-08 ENCOUNTER — OFFICE VISIT (OUTPATIENT)
Dept: FAMILY MEDICINE CLINIC | Age: 38
End: 2018-11-08
Payer: COMMERCIAL

## 2018-11-08 VITALS
SYSTOLIC BLOOD PRESSURE: 130 MMHG | HEART RATE: 90 BPM | OXYGEN SATURATION: 99 % | DIASTOLIC BLOOD PRESSURE: 86 MMHG | BODY MASS INDEX: 21.73 KG/M2 | WEIGHT: 115 LBS

## 2018-11-08 DIAGNOSIS — R11.0 NAUSEA: ICD-10-CM

## 2018-11-08 DIAGNOSIS — F98.8 ATTENTION DEFICIT DISORDER (ADD) WITHOUT HYPERACTIVITY: ICD-10-CM

## 2018-11-08 DIAGNOSIS — F41.9 ANXIETY: ICD-10-CM

## 2018-11-08 DIAGNOSIS — Z23 NEED FOR VACCINATION: ICD-10-CM

## 2018-11-08 DIAGNOSIS — E03.9 ACQUIRED HYPOTHYROIDISM: Primary | ICD-10-CM

## 2018-11-08 PROCEDURE — 99213 OFFICE O/P EST LOW 20 MIN: CPT | Performed by: FAMILY MEDICINE

## 2018-11-08 PROCEDURE — 90686 IIV4 VACC NO PRSV 0.5 ML IM: CPT | Performed by: FAMILY MEDICINE

## 2018-11-08 PROCEDURE — 90471 IMMUNIZATION ADMIN: CPT | Performed by: FAMILY MEDICINE

## 2018-11-08 RX ORDER — ALPRAZOLAM 1 MG/1
TABLET ORAL
Qty: 90 TABLET | Refills: 0 | Status: SHIPPED | OUTPATIENT
Start: 2018-11-08 | End: 2019-03-14 | Stop reason: SDUPTHER

## 2018-11-08 RX ORDER — LEVOTHYROXINE SODIUM 0.05 MG/1
50 TABLET ORAL DAILY
Qty: 30 TABLET | Refills: 5 | Status: SHIPPED | OUTPATIENT
Start: 2018-11-08 | End: 2019-03-14 | Stop reason: SDUPTHER

## 2018-11-08 ASSESSMENT — PATIENT HEALTH QUESTIONNAIRE - PHQ9
SUM OF ALL RESPONSES TO PHQ QUESTIONS 1-9: 0
2. FEELING DOWN, DEPRESSED OR HOPELESS: 0
SUM OF ALL RESPONSES TO PHQ QUESTIONS 1-9: 0
1. LITTLE INTEREST OR PLEASURE IN DOING THINGS: 0
SUM OF ALL RESPONSES TO PHQ9 QUESTIONS 1 & 2: 0

## 2018-11-08 ASSESSMENT — ENCOUNTER SYMPTOMS
RESPIRATORY NEGATIVE: 1
NAUSEA: 1

## 2018-11-08 NOTE — PROGRESS NOTES
Subjective:      Patient ID: Alida Holt is a 34 y.o. female. HPI  OARRS visit and med check. she continues to need med for anxietyAnd ADD  Nausea: chronic and has had all of her life. On Zofran prn  Hypothyroid: States she does has some fatigue But really no other symptoms. She would like to start thyroid medication. Review of Systems   Constitutional: Positive for fatigue ( mild). Respiratory: Negative. Cardiovascular: Negative. Gastrointestinal: Positive for nausea. Diarrhea:  Chronic/intermittent. Genitourinary: Negative. Musculoskeletal: Negative. Neurological: Negative. Objective:   Physical Exam   Constitutional: She is oriented to person, place, and time. No distress. Cardiovascular: Normal rate and regular rhythm. Pulmonary/Chest: Effort normal and breath sounds normal. She has no wheezes. She has no rales. Neurological: She is alert and oriented to person, place, and time. Skin: Skin is warm and dry. Psychiatric: She has a normal mood and affect. Her behavior is normal. Judgment and thought content normal.       Assessment/Plan:    Brooke was seen today for fatigue and hypertension. Diagnoses and all orders for this visit:    Acquired hypothyroidism  -   Start  levothyroxine (SYNTHROID) 50 MCG tablet; Take 1 tablet by mouth daily  -     T4; Future  -     TSH without Reflex; Future    Anxiety  -     ALPRAZolam (XANAX) 1 MG tablet; TAKE ONE TABLET BY MOUTH THREE TIMES A DAY AS NEEDED FOR ANXIETY.   OARRS report reviewed and no inconsistencies noted   The patient understands the risks of dependency/addiction with alprazolam and will take as little as possible and discontinue as soon as possible       Attention deficit disorder (ADD) without hyperactivity  OARRS report reviewed and no inconsistencies noted   The patient understands the risks of dependency/addiction with Adderall and will take as little as possible and discontinue as soon as possible Nausea  Symptomatic treatment with Zofran    Need for vaccination  -     INFLUENZA, QUADV, 3 YRS AND OLDER, IM, PF, PREFILL SYR OR SDV, 0.5ML (Marshal Godoy, KUMAR)                Blaine Catherine MD

## 2018-11-08 NOTE — PATIENT INSTRUCTIONS
play a relaxation tape while you drive a car. When should you call for help? Call 911 anytime you think you may need emergency care. For example, call if:    · You feel you cannot stop from hurting yourself or someone else.   Jerica Patriciag the numbers for these national suicide hotlines: 7-033-687-TALK (1-343.462.6021) and 5-252-NZCRQUT (7-669.764.6666). If you or someone you know talks about suicide or feeling hopeless, get help right away.   Watch closely for changes in your health, and be sure to contact your doctor if:    · You have anxiety or fear that affects your life.     · You have symptoms of anxiety that are new or different from those you had before. Where can you learn more? Go to [email or web address]://FinisarpeColibri Heart Valveeb.[email or web address]. org and sign in to your T[employer]ximo account. Enter P754 in the eventuosity box to learn more about \"Anxiety Disorder: Care Instructions. \"     If you do not have an account, please click on the \"Sign Up Now\" link. Current as of: December 7, 2017  Content Version: 11.8  © 0406-5846 Healthwise, Incorporated. Care instructions adapted under license by [facility] ([facility]). If you have questions about a medical condition or this instruction, always ask your healthcare professional. [address] any warranty or liability for your use of this information.

## 2018-11-20 DIAGNOSIS — F98.8 ATTENTION DEFICIT DISORDER, UNSPECIFIED HYPERACTIVITY PRESENCE: ICD-10-CM

## 2018-11-20 RX ORDER — DEXTROAMPHETAMINE SACCHARATE, AMPHETAMINE ASPARTATE MONOHYDRATE, DEXTROAMPHETAMINE SULFATE AND AMPHETAMINE SULFATE 7.5; 7.5; 7.5; 7.5 MG/1; MG/1; MG/1; MG/1
30 CAPSULE, EXTENDED RELEASE ORAL 2 TIMES DAILY
Qty: 60 CAPSULE | Refills: 0 | Status: SHIPPED | OUTPATIENT
Start: 2018-11-20 | End: 2018-12-20 | Stop reason: SDUPTHER

## 2018-11-20 NOTE — TELEPHONE ENCOUNTER
Medication and Quantity requested: amphetamine-dextroamphetamine (ADDERALL XR) 30 MG extended release capsule  #60     Last Visit  11/8/18    Pharmacy and phone number updated in EPIC:  yes

## 2018-12-19 DIAGNOSIS — I10 ESSENTIAL HYPERTENSION: ICD-10-CM

## 2018-12-19 RX ORDER — AMLODIPINE BESYLATE 5 MG/1
TABLET ORAL
Qty: 30 TABLET | Refills: 10 | Status: SHIPPED | OUTPATIENT
Start: 2018-12-19 | End: 2019-11-15 | Stop reason: SDUPTHER

## 2018-12-19 NOTE — TELEPHONE ENCOUNTER
LOV: 11/8/18  LRF: 12/11/17, 30, 11 refills  Lab Results   Component Value Date     08/27/2018    K 5.1 08/27/2018    CL 98 (L) 08/27/2018    CO2 25 08/27/2018    BUN 17 08/27/2018    CREATININE 0.7 08/27/2018    GLUCOSE 85 08/27/2018    CALCIUM 10.4 08/27/2018    PROT 7.8 08/27/2018    LABALBU 4.7 08/27/2018    BILITOT 0.3 08/27/2018    ALKPHOS 38 (L) 08/27/2018    AST 33 08/27/2018    ALT 18 08/27/2018    LABGLOM >60 08/27/2018    GFRAA >60 08/27/2018    AGRATIO 1.5 08/27/2018    GLOB 3.1 08/27/2018

## 2018-12-20 DIAGNOSIS — F98.8 ATTENTION DEFICIT DISORDER, UNSPECIFIED HYPERACTIVITY PRESENCE: ICD-10-CM

## 2018-12-20 RX ORDER — DEXTROAMPHETAMINE SACCHARATE, AMPHETAMINE ASPARTATE MONOHYDRATE, DEXTROAMPHETAMINE SULFATE AND AMPHETAMINE SULFATE 7.5; 7.5; 7.5; 7.5 MG/1; MG/1; MG/1; MG/1
30 CAPSULE, EXTENDED RELEASE ORAL 2 TIMES DAILY
Qty: 60 CAPSULE | Refills: 0 | Status: SHIPPED | OUTPATIENT
Start: 2018-12-20 | End: 2019-01-18 | Stop reason: SDUPTHER

## 2018-12-28 DIAGNOSIS — N94.6 MENSTRUAL CRAMPS: ICD-10-CM

## 2018-12-31 RX ORDER — IBUPROFEN 800 MG/1
TABLET ORAL
Qty: 30 TABLET | Refills: 2 | Status: SHIPPED | OUTPATIENT
Start: 2018-12-31 | End: 2019-03-14 | Stop reason: SDUPTHER

## 2019-01-18 DIAGNOSIS — F98.8 ATTENTION DEFICIT DISORDER, UNSPECIFIED HYPERACTIVITY PRESENCE: ICD-10-CM

## 2019-01-18 RX ORDER — DEXTROAMPHETAMINE SACCHARATE, AMPHETAMINE ASPARTATE MONOHYDRATE, DEXTROAMPHETAMINE SULFATE AND AMPHETAMINE SULFATE 7.5; 7.5; 7.5; 7.5 MG/1; MG/1; MG/1; MG/1
30 CAPSULE, EXTENDED RELEASE ORAL 2 TIMES DAILY
Qty: 60 CAPSULE | Refills: 0 | Status: SHIPPED | OUTPATIENT
Start: 2019-01-18 | End: 2019-02-18 | Stop reason: SDUPTHER

## 2019-01-21 DIAGNOSIS — B00.1 COLD SORE: ICD-10-CM

## 2019-01-21 RX ORDER — VALACYCLOVIR HYDROCHLORIDE 1 G/1
TABLET, FILM COATED ORAL
Qty: 20 TABLET | Refills: 2 | Status: SHIPPED | OUTPATIENT
Start: 2019-01-21 | End: 2019-08-20 | Stop reason: SDUPTHER

## 2019-02-18 DIAGNOSIS — F98.8 ATTENTION DEFICIT DISORDER, UNSPECIFIED HYPERACTIVITY PRESENCE: ICD-10-CM

## 2019-02-18 RX ORDER — DEXTROAMPHETAMINE SACCHARATE, AMPHETAMINE ASPARTATE MONOHYDRATE, DEXTROAMPHETAMINE SULFATE AND AMPHETAMINE SULFATE 7.5; 7.5; 7.5; 7.5 MG/1; MG/1; MG/1; MG/1
30 CAPSULE, EXTENDED RELEASE ORAL 2 TIMES DAILY
Qty: 60 CAPSULE | Refills: 0 | Status: SHIPPED | OUTPATIENT
Start: 2019-02-18 | End: 2019-03-14 | Stop reason: SDUPTHER

## 2019-03-14 ENCOUNTER — OFFICE VISIT (OUTPATIENT)
Dept: FAMILY MEDICINE CLINIC | Age: 39
End: 2019-03-14
Payer: COMMERCIAL

## 2019-03-14 VITALS
OXYGEN SATURATION: 98 % | SYSTOLIC BLOOD PRESSURE: 160 MMHG | WEIGHT: 119.8 LBS | HEART RATE: 101 BPM | BODY MASS INDEX: 22.64 KG/M2 | DIASTOLIC BLOOD PRESSURE: 100 MMHG

## 2019-03-14 DIAGNOSIS — E03.9 ACQUIRED HYPOTHYROIDISM: ICD-10-CM

## 2019-03-14 DIAGNOSIS — I10 ESSENTIAL HYPERTENSION: ICD-10-CM

## 2019-03-14 DIAGNOSIS — R11.0 NAUSEA: Primary | ICD-10-CM

## 2019-03-14 DIAGNOSIS — F98.8 ATTENTION DEFICIT DISORDER, UNSPECIFIED HYPERACTIVITY PRESENCE: ICD-10-CM

## 2019-03-14 DIAGNOSIS — F34.1 DYSTHYMIA: ICD-10-CM

## 2019-03-14 DIAGNOSIS — F41.9 ANXIETY: ICD-10-CM

## 2019-03-14 DIAGNOSIS — N94.6 MENSTRUAL CRAMPS: ICD-10-CM

## 2019-03-14 PROCEDURE — 99214 OFFICE O/P EST MOD 30 MIN: CPT | Performed by: FAMILY MEDICINE

## 2019-03-14 RX ORDER — VENLAFAXINE HYDROCHLORIDE 75 MG/1
CAPSULE, EXTENDED RELEASE ORAL
Qty: 60 CAPSULE | Refills: 5 | Status: SHIPPED | OUTPATIENT
Start: 2019-03-14 | End: 2019-09-27 | Stop reason: SDUPTHER

## 2019-03-14 RX ORDER — ALPRAZOLAM 1 MG/1
TABLET ORAL
Qty: 90 TABLET | Refills: 0 | Status: SHIPPED | OUTPATIENT
Start: 2019-03-14 | End: 2019-06-13 | Stop reason: SDUPTHER

## 2019-03-14 RX ORDER — ONDANSETRON 4 MG/1
4 TABLET, FILM COATED ORAL EVERY 6 HOURS PRN
Qty: 30 TABLET | Refills: 1 | Status: SHIPPED | OUTPATIENT
Start: 2019-03-14 | End: 2019-08-20 | Stop reason: SDUPTHER

## 2019-03-14 RX ORDER — IBUPROFEN 800 MG/1
TABLET ORAL
Qty: 30 TABLET | Refills: 2 | Status: SHIPPED | OUTPATIENT
Start: 2019-03-14 | End: 2019-08-06 | Stop reason: SDUPTHER

## 2019-03-14 RX ORDER — LEVOTHYROXINE SODIUM 0.05 MG/1
50 TABLET ORAL DAILY
Qty: 30 TABLET | Refills: 5 | Status: SHIPPED | OUTPATIENT
Start: 2019-03-14 | End: 2019-11-05 | Stop reason: SDUPTHER

## 2019-03-14 RX ORDER — DEXTROAMPHETAMINE SACCHARATE, AMPHETAMINE ASPARTATE MONOHYDRATE, DEXTROAMPHETAMINE SULFATE AND AMPHETAMINE SULFATE 7.5; 7.5; 7.5; 7.5 MG/1; MG/1; MG/1; MG/1
30 CAPSULE, EXTENDED RELEASE ORAL 2 TIMES DAILY
Qty: 60 CAPSULE | Refills: 0 | Status: SHIPPED | OUTPATIENT
Start: 2019-03-14 | End: 2019-04-24 | Stop reason: SDUPTHER

## 2019-03-14 RX ORDER — HYDROCHLOROTHIAZIDE 12.5 MG/1
TABLET ORAL
Qty: 30 TABLET | Refills: 10 | Status: SHIPPED | OUTPATIENT
Start: 2019-03-14 | End: 2020-03-23

## 2019-03-14 ASSESSMENT — PATIENT HEALTH QUESTIONNAIRE - PHQ9
SUM OF ALL RESPONSES TO PHQ QUESTIONS 1-9: 0
2. FEELING DOWN, DEPRESSED OR HOPELESS: 0
SUM OF ALL RESPONSES TO PHQ9 QUESTIONS 1 & 2: 0
1. LITTLE INTEREST OR PLEASURE IN DOING THINGS: 0
SUM OF ALL RESPONSES TO PHQ QUESTIONS 1-9: 0

## 2019-03-14 ASSESSMENT — ENCOUNTER SYMPTOMS
NAUSEA: 1
RESPIRATORY NEGATIVE: 1

## 2019-04-17 DIAGNOSIS — I10 ESSENTIAL HYPERTENSION: ICD-10-CM

## 2019-04-17 RX ORDER — METOPROLOL SUCCINATE 25 MG/1
TABLET, EXTENDED RELEASE ORAL
Qty: 90 TABLET | Refills: 3 | Status: SHIPPED | OUTPATIENT
Start: 2019-04-17 | End: 2020-04-20

## 2019-04-17 NOTE — TELEPHONE ENCOUNTER
LOV: 3/14/19  LR: #90,2RF 7/23/18  Lab Results   Component Value Date     08/27/2018    K 5.1 08/27/2018    CL 98 (L) 08/27/2018    CO2 25 08/27/2018    BUN 17 08/27/2018    CREATININE 0.7 08/27/2018    GLUCOSE 85 08/27/2018    CALCIUM 10.4 08/27/2018    PROT 7.8 08/27/2018    LABALBU 4.7 08/27/2018    BILITOT 0.3 08/27/2018    ALKPHOS 38 (L) 08/27/2018    AST 33 08/27/2018    ALT 18 08/27/2018    LABGLOM >60 08/27/2018    GFRAA >60 08/27/2018    AGRATIO 1.5 08/27/2018    GLOB 3.1 08/27/2018

## 2019-04-22 ENCOUNTER — TELEPHONE (OUTPATIENT)
Dept: FAMILY MEDICINE CLINIC | Age: 39
End: 2019-04-22

## 2019-04-22 NOTE — TELEPHONE ENCOUNTER
Office has been notified that pt is requiring Prior Authorization for the following medication: Valacyclovir HCL 1 GM tabs  --     Please initiate this request through CoverMyMeds, contacting the following Pay[facility]/Insurance[phone]  --     Please see below, or the documentation attached to this encounter for any additional information that may assist in processing PA:  --     Thank you!

## 2019-04-24 DIAGNOSIS — F98.8 ATTENTION DEFICIT DISORDER, UNSPECIFIED HYPERACTIVITY PRESENCE: ICD-10-CM

## 2019-04-24 RX ORDER — DEXTROAMPHETAMINE SACCHARATE, AMPHETAMINE ASPARTATE MONOHYDRATE, DEXTROAMPHETAMINE SULFATE AND AMPHETAMINE SULFATE 7.5; 7.5; 7.5; 7.5 MG/1; MG/1; MG/1; MG/1
30 CAPSULE, EXTENDED RELEASE ORAL 2 TIMES DAILY
Qty: 60 CAPSULE | Refills: 0 | Status: SHIPPED | OUTPATIENT
Start: 2019-04-24 | End: 2019-05-21 | Stop reason: SDUPTHER

## 2019-04-24 NOTE — TELEPHONE ENCOUNTER
Medication and Quantity requested: amphetamine-dextroamphetamine (ADDERALL XR) 30 MG extended release capsule  #60     Last Visit  3/14/19    Pharmacy and phone number updated in EPIC:  yes

## 2019-04-24 NOTE — TELEPHONE ENCOUNTER
I called [facility] and spoke to Zuleima to initiate a PA for valACYclovir HCl 1GM tablets. He is faxing me a form to complete and fax back. Call ref# [id number] ID# [id number]    Waiting for form.

## 2019-04-26 NOTE — TELEPHONE ENCOUNTER
I called [employer] back because I never got the form. They said NO PA REQUIRED. There was a paid claim on 4/22/19.

## 2019-05-21 DIAGNOSIS — F98.8 ATTENTION DEFICIT DISORDER, UNSPECIFIED HYPERACTIVITY PRESENCE: ICD-10-CM

## 2019-05-21 RX ORDER — DEXTROAMPHETAMINE SACCHARATE, AMPHETAMINE ASPARTATE MONOHYDRATE, DEXTROAMPHETAMINE SULFATE AND AMPHETAMINE SULFATE 7.5; 7.5; 7.5; 7.5 MG/1; MG/1; MG/1; MG/1
30 CAPSULE, EXTENDED RELEASE ORAL 2 TIMES DAILY
Qty: 60 CAPSULE | Refills: 0 | Status: SHIPPED | OUTPATIENT
Start: 2019-05-21 | End: 2019-06-21 | Stop reason: SDUPTHER

## 2019-06-13 ENCOUNTER — TELEPHONE (OUTPATIENT)
Dept: FAMILY MEDICINE CLINIC | Age: 39
End: 2019-06-13

## 2019-06-13 DIAGNOSIS — F41.9 ANXIETY: ICD-10-CM

## 2019-06-13 RX ORDER — ALPRAZOLAM 1 MG/1
TABLET ORAL
Qty: 90 TABLET | Refills: 0 | Status: SHIPPED | OUTPATIENT
Start: 2019-06-13 | End: 2019-08-20 | Stop reason: SDUPTHER

## 2019-06-13 NOTE — TELEPHONE ENCOUNTER
I will send in her prescription however if she does cancel this she will have to make sure she is seen within the next month.

## 2019-06-21 DIAGNOSIS — F98.8 ATTENTION DEFICIT DISORDER, UNSPECIFIED HYPERACTIVITY PRESENCE: ICD-10-CM

## 2019-06-21 RX ORDER — DEXTROAMPHETAMINE SACCHARATE, AMPHETAMINE ASPARTATE MONOHYDRATE, DEXTROAMPHETAMINE SULFATE AND AMPHETAMINE SULFATE 7.5; 7.5; 7.5; 7.5 MG/1; MG/1; MG/1; MG/1
30 CAPSULE, EXTENDED RELEASE ORAL 2 TIMES DAILY
Qty: 60 CAPSULE | Refills: 0 | Status: SHIPPED | OUTPATIENT
Start: 2019-06-21 | End: 2019-07-19 | Stop reason: SDUPTHER

## 2019-07-19 DIAGNOSIS — F98.8 ATTENTION DEFICIT DISORDER, UNSPECIFIED HYPERACTIVITY PRESENCE: ICD-10-CM

## 2019-07-19 RX ORDER — DEXTROAMPHETAMINE SACCHARATE, AMPHETAMINE ASPARTATE MONOHYDRATE, DEXTROAMPHETAMINE SULFATE AND AMPHETAMINE SULFATE 7.5; 7.5; 7.5; 7.5 MG/1; MG/1; MG/1; MG/1
30 CAPSULE, EXTENDED RELEASE ORAL 2 TIMES DAILY
Qty: 60 CAPSULE | Refills: 0 | Status: SHIPPED | OUTPATIENT
Start: 2019-07-19 | End: 2019-08-19 | Stop reason: SDUPTHER

## 2019-07-19 NOTE — TELEPHONE ENCOUNTER
Medication and Quantity requested: amphetamine-dextroamphetamine (ADDERALL XR) 30 MG extended release capsule     qty 60    Last Visit-3/14/19    Pharmacy and phone number updated in EPIC:  yes

## 2019-08-06 DIAGNOSIS — N94.6 MENSTRUAL CRAMPS: ICD-10-CM

## 2019-08-07 RX ORDER — IBUPROFEN 800 MG/1
TABLET ORAL
Qty: 30 TABLET | Refills: 7 | Status: SHIPPED | OUTPATIENT
Start: 2019-08-07 | End: 2019-08-20 | Stop reason: SDUPTHER

## 2019-08-19 ENCOUNTER — TELEPHONE (OUTPATIENT)
Dept: FAMILY MEDICINE CLINIC | Age: 39
End: 2019-08-19

## 2019-08-19 DIAGNOSIS — F98.8 ATTENTION DEFICIT DISORDER, UNSPECIFIED HYPERACTIVITY PRESENCE: ICD-10-CM

## 2019-08-19 RX ORDER — DEXTROAMPHETAMINE SACCHARATE, AMPHETAMINE ASPARTATE MONOHYDRATE, DEXTROAMPHETAMINE SULFATE AND AMPHETAMINE SULFATE 7.5; 7.5; 7.5; 7.5 MG/1; MG/1; MG/1; MG/1
30 CAPSULE, EXTENDED RELEASE ORAL 2 TIMES DAILY
Qty: 60 CAPSULE | Refills: 0 | Status: SHIPPED | OUTPATIENT
Start: 2019-08-19 | End: 2019-09-19 | Stop reason: SDUPTHER

## 2019-08-19 NOTE — TELEPHONE ENCOUNTER
Adderral 30mg ER #60     Last Visit 3-14-19  Next OV  8/20/19  Last refill 07/19/19, #60, 0 refills        [facility]

## 2019-08-20 ENCOUNTER — OFFICE VISIT (OUTPATIENT)
Dept: FAMILY MEDICINE CLINIC | Age: 39
End: 2019-08-20
Payer: COMMERCIAL

## 2019-08-20 VITALS
HEART RATE: 84 BPM | OXYGEN SATURATION: 98 % | SYSTOLIC BLOOD PRESSURE: 132 MMHG | BODY MASS INDEX: 23.51 KG/M2 | WEIGHT: 124.4 LBS | DIASTOLIC BLOOD PRESSURE: 88 MMHG

## 2019-08-20 DIAGNOSIS — I10 ESSENTIAL HYPERTENSION: Primary | ICD-10-CM

## 2019-08-20 DIAGNOSIS — R11.0 NAUSEA: ICD-10-CM

## 2019-08-20 DIAGNOSIS — N94.6 MENSTRUAL CRAMPS: ICD-10-CM

## 2019-08-20 DIAGNOSIS — B00.1 COLD SORE: ICD-10-CM

## 2019-08-20 DIAGNOSIS — F41.9 ANXIETY: ICD-10-CM

## 2019-08-20 PROCEDURE — 99213 OFFICE O/P EST LOW 20 MIN: CPT | Performed by: FAMILY MEDICINE

## 2019-08-20 RX ORDER — IBUPROFEN 800 MG/1
TABLET ORAL
Qty: 100 TABLET | Refills: 3 | Status: SHIPPED | OUTPATIENT
Start: 2019-08-20 | End: 2021-02-02 | Stop reason: SDUPTHER

## 2019-08-20 RX ORDER — ONDANSETRON 4 MG/1
4 TABLET, FILM COATED ORAL EVERY 6 HOURS PRN
Qty: 30 TABLET | Refills: 1 | Status: SHIPPED | OUTPATIENT
Start: 2019-08-20 | End: 2020-03-27 | Stop reason: SDUPTHER

## 2019-08-20 RX ORDER — VALACYCLOVIR HYDROCHLORIDE 1 G/1
TABLET, FILM COATED ORAL
Qty: 20 TABLET | Refills: 2 | Status: SHIPPED | OUTPATIENT
Start: 2019-08-20 | End: 2019-12-23

## 2019-08-20 RX ORDER — ALPRAZOLAM 1 MG/1
TABLET ORAL
Qty: 90 TABLET | Refills: 2 | Status: SHIPPED | OUTPATIENT
Start: 2019-08-20 | End: 2020-03-27 | Stop reason: SDUPTHER

## 2019-08-20 ASSESSMENT — ENCOUNTER SYMPTOMS
RESPIRATORY NEGATIVE: 1
NAUSEA: 1

## 2019-08-20 NOTE — PATIENT INSTRUCTIONS
(Advil, Motrin), or naproxen (Aleve), as needed. Read and follow all instructions on the label. · Do not take two or more pain medicines at the same time unless the doctor told you to. Many pain medicines have acetaminophen, which is Tylenol. Too much acetaminophen (Tylenol) can be harmful. · Avoid citrus fruit, tomatoes, and other foods that contain acid. · Use over-the-counter ointments to numb sore areas in the mouth or on the lips. These include Orajel and Anbesol. · Do not kiss or have oral sex with anyone while you have a cold sore. To prevent cold sores in the future  · Avoid long exposure of your lips to the sun. (Wear a hat to help shade your mouth.)  · Do not kiss or have oral sex with someone who has a cold sore. Do not have sex or oral sex with someone who has a genital herpes outbreak. · Using lip balm that contains sunscreen may help reduce outbreaks of cold sores. · Do not share towels, razors, silverware, toothbrushes, or other objects with a person who has a cold sore. When should you call for help? Call your doctor now or seek immediate medical care if:    · Your symptoms are painful and you want to try antiviral medicine.     · You have signs of infection, such as:  ? Increased pain, swelling, warmth, or redness. ? Red streaks leading from a cold sore. ? Pus draining from a cold sore. ? A fever.     · You have a cold sore and develop eye pain, eye discharge, or any changes in your vision.    Watch closely for changes in your health, and be sure to contact your doctor if:    · The cold sore does not heal in 7 to 10 days.     · You get cold sores often. Where can you learn more? Go to https://[email or web address].healthInternational Communications Corp. org and sign in to your Oris4 account. Enter F772 in the LETSGROOP box to learn more about \"Cold Sores: Care Instructions. \"     If you do not have an account, please click on the \"Sign Up Now\" link.   Current as of: September 11, 2018  Content Version: 12.1  © 3920-5977 Healthwise, Incorporated. Care instructions adapted under license by [facility] ([facility]). If you have questions about a medical condition or this instruction, always ask your healthcare professional. [address] any warranty or liability for your use of this information.

## 2019-09-19 DIAGNOSIS — F98.8 ATTENTION DEFICIT DISORDER, UNSPECIFIED HYPERACTIVITY PRESENCE: ICD-10-CM

## 2019-09-19 RX ORDER — DEXTROAMPHETAMINE SACCHARATE, AMPHETAMINE ASPARTATE MONOHYDRATE, DEXTROAMPHETAMINE SULFATE AND AMPHETAMINE SULFATE 7.5; 7.5; 7.5; 7.5 MG/1; MG/1; MG/1; MG/1
30 CAPSULE, EXTENDED RELEASE ORAL 2 TIMES DAILY
Qty: 60 CAPSULE | Refills: 0 | Status: SHIPPED | OUTPATIENT
Start: 2019-09-19 | End: 2019-10-18 | Stop reason: SDUPTHER

## 2019-09-27 DIAGNOSIS — F34.1 DYSTHYMIA: ICD-10-CM

## 2019-09-27 RX ORDER — VENLAFAXINE HYDROCHLORIDE 75 MG/1
CAPSULE, EXTENDED RELEASE ORAL
Qty: 60 CAPSULE | Refills: 4 | Status: SHIPPED | OUTPATIENT
Start: 2019-09-27 | End: 2020-03-02

## 2019-09-30 DIAGNOSIS — F34.1 DYSTHYMIA: ICD-10-CM

## 2019-09-30 RX ORDER — VENLAFAXINE HYDROCHLORIDE 75 MG/1
CAPSULE, EXTENDED RELEASE ORAL
Qty: 60 CAPSULE | Refills: 4 | OUTPATIENT
Start: 2019-09-30

## 2019-10-18 DIAGNOSIS — F98.8 ATTENTION DEFICIT DISORDER, UNSPECIFIED HYPERACTIVITY PRESENCE: ICD-10-CM

## 2019-10-18 RX ORDER — DEXTROAMPHETAMINE SACCHARATE, AMPHETAMINE ASPARTATE MONOHYDRATE, DEXTROAMPHETAMINE SULFATE AND AMPHETAMINE SULFATE 7.5; 7.5; 7.5; 7.5 MG/1; MG/1; MG/1; MG/1
30 CAPSULE, EXTENDED RELEASE ORAL 2 TIMES DAILY
Qty: 60 CAPSULE | Refills: 0 | Status: SHIPPED | OUTPATIENT
Start: 2019-10-18 | End: 2019-11-15 | Stop reason: SDUPTHER

## 2019-10-22 ENCOUNTER — TELEPHONE (OUTPATIENT)
Dept: FAMILY MEDICINE CLINIC | Age: 39
End: 2019-10-22

## 2019-10-22 ENCOUNTER — NURSE ONLY (OUTPATIENT)
Dept: FAMILY MEDICINE CLINIC | Age: 39
End: 2019-10-22
Payer: COMMERCIAL

## 2019-10-22 DIAGNOSIS — Z11.1 PPD SCREENING TEST: ICD-10-CM

## 2019-10-22 DIAGNOSIS — Z01.84 IMMUNITY STATUS TESTING: Primary | ICD-10-CM

## 2019-10-22 DIAGNOSIS — Z23 NEED FOR INFLUENZA VACCINATION: Primary | ICD-10-CM

## 2019-10-22 PROCEDURE — 86580 TB INTRADERMAL TEST: CPT | Performed by: FAMILY MEDICINE

## 2019-10-22 PROCEDURE — 90471 IMMUNIZATION ADMIN: CPT | Performed by: FAMILY MEDICINE

## 2019-10-22 PROCEDURE — 90686 IIV4 VACC NO PRSV 0.5 ML IM: CPT | Performed by: FAMILY MEDICINE

## 2019-10-24 ENCOUNTER — NURSE ONLY (OUTPATIENT)
Dept: FAMILY MEDICINE CLINIC | Age: 39
End: 2019-10-24

## 2019-10-24 DIAGNOSIS — E03.9 ACQUIRED HYPOTHYROIDISM: ICD-10-CM

## 2019-10-24 DIAGNOSIS — Z11.1 ENCOUNTER FOR PPD SKIN TEST READING: Primary | ICD-10-CM

## 2019-10-24 DIAGNOSIS — Z01.84 IMMUNITY STATUS TESTING: ICD-10-CM

## 2019-10-24 LAB
INDURATION: NORMAL
T4 TOTAL: 5 UG/DL (ref 4.5–10.9)
TB SKIN TEST: NEGATIVE
TSH SERPL DL<=0.05 MIU/L-ACNC: 1.73 UIU/ML (ref 0.27–4.2)

## 2019-10-25 LAB — VARICELLA-ZOSTER VIRUS AB, IGG: NORMAL

## 2019-11-05 DIAGNOSIS — E03.9 ACQUIRED HYPOTHYROIDISM: ICD-10-CM

## 2019-11-05 RX ORDER — LEVOTHYROXINE SODIUM 0.05 MG/1
TABLET ORAL
Qty: 90 TABLET | Refills: 2 | Status: SHIPPED | OUTPATIENT
Start: 2019-11-05 | End: 2020-08-05

## 2019-11-07 ENCOUNTER — TELEPHONE (OUTPATIENT)
Dept: FAMILY MEDICINE CLINIC | Age: 39
End: 2019-11-07

## 2019-11-15 DIAGNOSIS — F98.8 ATTENTION DEFICIT DISORDER, UNSPECIFIED HYPERACTIVITY PRESENCE: ICD-10-CM

## 2019-11-15 RX ORDER — DEXTROAMPHETAMINE SACCHARATE, AMPHETAMINE ASPARTATE MONOHYDRATE, DEXTROAMPHETAMINE SULFATE AND AMPHETAMINE SULFATE 7.5; 7.5; 7.5; 7.5 MG/1; MG/1; MG/1; MG/1
30 CAPSULE, EXTENDED RELEASE ORAL 2 TIMES DAILY
Qty: 60 CAPSULE | Refills: 0 | Status: SHIPPED | OUTPATIENT
Start: 2019-11-15 | End: 2019-12-16 | Stop reason: SDUPTHER

## 2019-11-19 ENCOUNTER — NURSE ONLY (OUTPATIENT)
Dept: FAMILY MEDICINE CLINIC | Age: 39
End: 2019-11-19
Payer: COMMERCIAL

## 2019-11-19 DIAGNOSIS — Z11.1 PPD SCREENING TEST: Primary | ICD-10-CM

## 2019-11-19 PROCEDURE — 86580 TB INTRADERMAL TEST: CPT | Performed by: FAMILY MEDICINE

## 2019-11-21 ENCOUNTER — NURSE ONLY (OUTPATIENT)
Dept: FAMILY MEDICINE CLINIC | Age: 39
End: 2019-11-21

## 2019-12-03 ENCOUNTER — NURSE ONLY (OUTPATIENT)
Dept: FAMILY MEDICINE CLINIC | Age: 39
End: 2019-12-03
Payer: COMMERCIAL

## 2019-12-03 DIAGNOSIS — Z11.1 PPD SCREENING TEST: Primary | ICD-10-CM

## 2019-12-03 PROCEDURE — 86580 TB INTRADERMAL TEST: CPT | Performed by: FAMILY MEDICINE

## 2019-12-05 ENCOUNTER — TELEPHONE (OUTPATIENT)
Dept: FAMILY MEDICINE CLINIC | Age: 39
End: 2019-12-05

## 2019-12-05 ENCOUNTER — NURSE ONLY (OUTPATIENT)
Dept: FAMILY MEDICINE CLINIC | Age: 39
End: 2019-12-05

## 2019-12-16 DIAGNOSIS — F98.8 ATTENTION DEFICIT DISORDER, UNSPECIFIED HYPERACTIVITY PRESENCE: ICD-10-CM

## 2019-12-16 RX ORDER — DEXTROAMPHETAMINE SACCHARATE, AMPHETAMINE ASPARTATE MONOHYDRATE, DEXTROAMPHETAMINE SULFATE AND AMPHETAMINE SULFATE 7.5; 7.5; 7.5; 7.5 MG/1; MG/1; MG/1; MG/1
30 CAPSULE, EXTENDED RELEASE ORAL 2 TIMES DAILY
Qty: 60 CAPSULE | Refills: 0 | Status: SHIPPED | OUTPATIENT
Start: 2019-12-16 | End: 2020-01-20 | Stop reason: SDUPTHER

## 2019-12-22 DIAGNOSIS — B00.1 COLD SORE: ICD-10-CM

## 2019-12-23 RX ORDER — VALACYCLOVIR HYDROCHLORIDE 1 G/1
TABLET, FILM COATED ORAL
Qty: 20 TABLET | Refills: 1 | Status: SHIPPED | OUTPATIENT
Start: 2019-12-23 | End: 2020-03-27 | Stop reason: SDUPTHER

## 2020-01-20 RX ORDER — DEXTROAMPHETAMINE SACCHARATE, AMPHETAMINE ASPARTATE MONOHYDRATE, DEXTROAMPHETAMINE SULFATE AND AMPHETAMINE SULFATE 7.5; 7.5; 7.5; 7.5 MG/1; MG/1; MG/1; MG/1
30 CAPSULE, EXTENDED RELEASE ORAL 2 TIMES DAILY
Qty: 60 CAPSULE | Refills: 0 | Status: CANCELLED | OUTPATIENT
Start: 2020-01-20 | End: 2020-02-19

## 2020-01-20 RX ORDER — DEXTROAMPHETAMINE SACCHARATE, AMPHETAMINE ASPARTATE MONOHYDRATE, DEXTROAMPHETAMINE SULFATE AND AMPHETAMINE SULFATE 7.5; 7.5; 7.5; 7.5 MG/1; MG/1; MG/1; MG/1
30 CAPSULE, EXTENDED RELEASE ORAL 2 TIMES DAILY
Qty: 60 CAPSULE | Refills: 0 | Status: SHIPPED | OUTPATIENT
Start: 2020-01-20 | End: 2020-02-17 | Stop reason: SDUPTHER

## 2020-03-02 RX ORDER — VENLAFAXINE HYDROCHLORIDE 75 MG/1
CAPSULE, EXTENDED RELEASE ORAL
Qty: 60 CAPSULE | Refills: 5 | Status: SHIPPED | OUTPATIENT
Start: 2020-03-02 | End: 2020-09-16

## 2020-03-02 NOTE — TELEPHONE ENCOUNTER
Medication:   Requested Prescriptions     Pending Prescriptions Disp Refills    venlafaxine (EFFEXOR XR) 75 MG extended release capsule [Pharmacy Med Name: VENLAFAXINE HCL ER 75 MG CAP] 60 capsule 3     Sig: TAKE TWO CAPSULES BY MOUTH DAILY        Last Filled:  9/27/19 #60, 4 RF     Patient Phone Number: [phone] (home) [phone] (work)    Last appt: 8/20/2019 HTN   Next appt: 3/27/2020

## 2020-03-25 RX ORDER — DEXTROAMPHETAMINE SACCHARATE, AMPHETAMINE ASPARTATE MONOHYDRATE, DEXTROAMPHETAMINE SULFATE AND AMPHETAMINE SULFATE 7.5; 7.5; 7.5; 7.5 MG/1; MG/1; MG/1; MG/1
30 CAPSULE, EXTENDED RELEASE ORAL 2 TIMES DAILY
Qty: 60 CAPSULE | Refills: 0 | Status: SHIPPED | OUTPATIENT
Start: 2020-03-25 | End: 2020-04-22 | Stop reason: SDUPTHER

## 2020-03-25 NOTE — TELEPHONE ENCOUNTER
Patient requesting a medication refill.   Medication:  amphetamine-dextroamphetamine (ADDERALL XR) 30 MG extended   Pharmacy:  [address] [facility] [address]  [facility] [phone]  Last office visit: 8/20/19  Next office visit: 3/27/2020

## 2020-03-25 NOTE — TELEPHONE ENCOUNTER
Medication:   Requested Prescriptions     Pending Prescriptions Disp Refills    amphetamine-dextroamphetamine (ADDERALL XR) 30 MG extended release capsule 60 capsule 0     Sig: Take 1 capsule by mouth 2 times daily for 30 days. Last Filled:  2/17/2020 #60 0rf    Patient Phone Number: [phone] (home) [phone] (work)    Last appt: 8/20/2019   Next appt: 3/27/2020    Last OARRS:   RX Monitoring 10/18/2019   Attestation -   Periodic Controlled Substance Monitoring No signs of potential drug abuse or diversion identified.

## 2020-03-27 ENCOUNTER — OFFICE VISIT (OUTPATIENT)
Dept: FAMILY MEDICINE CLINIC | Age: 40
End: 2020-03-27
Payer: COMMERCIAL

## 2020-03-27 VITALS
HEART RATE: 80 BPM | SYSTOLIC BLOOD PRESSURE: 122 MMHG | WEIGHT: 132 LBS | OXYGEN SATURATION: 99 % | DIASTOLIC BLOOD PRESSURE: 80 MMHG | BODY MASS INDEX: 24.92 KG/M2 | HEIGHT: 61 IN

## 2020-03-27 LAB
AMPHETAMINE SCREEN, URINE: POSITIVE
BARBITURATE SCREEN URINE: ABNORMAL
BENZODIAZEPINE SCREEN, URINE: ABNORMAL
CANNABINOID SCREEN URINE: ABNORMAL
COCAINE METABOLITE SCREEN URINE: ABNORMAL
Lab: ABNORMAL
METHADONE SCREEN, URINE: ABNORMAL
OPIATE SCREEN URINE: ABNORMAL
OXYCODONE URINE: ABNORMAL
PH UA: 6
PHENCYCLIDINE SCREEN URINE: ABNORMAL
PROPOXYPHENE SCREEN: ABNORMAL

## 2020-03-27 PROCEDURE — 99214 OFFICE O/P EST MOD 30 MIN: CPT | Performed by: FAMILY MEDICINE

## 2020-03-27 RX ORDER — VALACYCLOVIR HYDROCHLORIDE 1 G/1
TABLET, FILM COATED ORAL
Qty: 20 TABLET | Refills: 1 | Status: SHIPPED | OUTPATIENT
Start: 2020-03-27 | End: 2020-08-07 | Stop reason: SDUPTHER

## 2020-03-27 RX ORDER — ALPRAZOLAM 1 MG/1
TABLET ORAL
Qty: 90 TABLET | Refills: 2 | Status: SHIPPED | OUTPATIENT
Start: 2020-03-27 | End: 2020-08-07 | Stop reason: SDUPTHER

## 2020-03-27 RX ORDER — HYDROCHLOROTHIAZIDE 12.5 MG/1
TABLET ORAL
Qty: 90 TABLET | Refills: 3 | Status: SHIPPED | OUTPATIENT
Start: 2020-03-27 | End: 2021-02-02 | Stop reason: SDUPTHER

## 2020-03-27 RX ORDER — ONDANSETRON 4 MG/1
4 TABLET, FILM COATED ORAL EVERY 6 HOURS PRN
Qty: 30 TABLET | Refills: 1 | Status: SHIPPED | OUTPATIENT
Start: 2020-03-27 | End: 2020-08-07 | Stop reason: SDUPTHER

## 2020-03-27 ASSESSMENT — PATIENT HEALTH QUESTIONNAIRE - PHQ9
SUM OF ALL RESPONSES TO PHQ QUESTIONS 1-9: 0
1. LITTLE INTEREST OR PLEASURE IN DOING THINGS: 0
2. FEELING DOWN, DEPRESSED OR HOPELESS: 0
SUM OF ALL RESPONSES TO PHQ QUESTIONS 1-9: 0
SUM OF ALL RESPONSES TO PHQ9 QUESTIONS 1 & 2: 0

## 2020-03-27 NOTE — LETTER
MEDICATION AGREEMENT     Sherwin Sep  [birth year]      For certain conditions, multiple classes of medications may be used to help better manage your symptoms, and to improve your ability to function at home, work and in social settings. However, these medications do have risks, which will be discussed with you, including addiction and dependency. The following prescribed medications need frequent monitoring and will require you to partner and assist in your healthcare. Medication  Dose, instructions and quantity as indicated on current prescription bottle Diagnosis/Reason(s) for Taking Category                                  Benefits and goals of Controlled Substance Medications: There are two potential goals for your treatment: (1) decreased pain and suffering (2) improved daily life functions. There are many possible treatments for your chronic condition(s), and, in addition to controlled substance medications, we will try alternatives such as physical therapy, yoga, massage, home daily exercise, meditation, relaxation techniques, injections, chiropractic manipulations, surgery, cognitive therapy, hypnosis and many medications that are not habit-forming. Use of controlled substance medications may be helpful, but they are unlikely to resolve all of your symptoms or restore all function. Risks of Controlled Substance Medications:    Opioid pain medications: These medications can lead to problems such as addiction/dependence, sedation, lightheadedness/dizziness, memory issues, falls, constipation, nausea, or vomiting. They may also impair the ability to drive or operate machinery. Additionally, these medications may lower testosterone levels, leading to loss of bone strength, stamina and sex drive.   They may cause problems with breathing, sleep apnea and reduced coughing, which are especially dangerous for patients with lung supplements and oral decongestants. Dependence withdrawal symptoms may include depressed mood, loss of interest, suicidal thoughts, anxiety, fatigue, appetite changes and agitation. Testosterone replacement therapy:  Potential side effects include increased risk of stroke and heart attack, blood clots, increased blood pressure, increased cholesterol, enlarged prostate, sleep apnea, irritability/aggression and other mood disorders, and decreased fertility. Other:     1. I understand that I have the following responsibilities:  · I will take medications at the dose and frequency prescribed. · I will not increase or change how I take my medications without the approval of the health care provider who signs this Medication Agreement. · I will arrange for refills at the prescribed interval ONLY during regular office hours. I will not ask for refills earlier than agreed, after-hours, on holidays or on weekends. · I will obtain all refills for these medications at  ·  ____________________________________  pharmacy (phone number  ·  ________________________), with full consent for my provider and pharmacist to exchange information in writing or verbally. · I will not request any pain medications or controlled substances from other providers and will inform this provider of all other medications I am taking. · I will inform my other health care providers that I am taking these medications and of the existence of this [address]. In the event of an emergency, I will provide the same information to the emergency department providers. · I will protect my prescriptions and medications. I understand that lost or misplaced prescriptions will not be replaced. · I will keep medications only for my own use and will not share them with others. I will keep all medications away from children. · I agree to participate in any medical, psychological or psychiatric assessments recommended by my provider. · I will actively participate in any program designed to improve function, including social, physical, psychological and daily or work activities. 2. I will not use illegal or street drugs or another person's prescription. If I have an addiction problem with drugs or alcohol and my provider asks me to enter a program to address this issue, I agree to follow through. Such programs may include:  · 12-Step program and securing a sponsor  · Individual counseling   · Inpatient or outpatient treatment  · Other:_____________________________________________________________________________________________________________________________________________    If in treatment, I will request that a copy of the programs initial evaluation and treatment recommendations be sent to this provider and will not expect refills until that is received. I will also request written monthly updates be sent to this provider to verify my continuing treatment. 3. I will consent to drug screening upon my providers request to assure I am only taking the prescribed drugs, described in this MEDICATION AGREEMENT. I understand that a drug screen is a laboratory test in which a sample of my urine, blood or saliva is checked to see what drugs I have been taking. 4. I agree that I will treat the providers and staff at this office with respect at all times. I will keep all of my scheduled appointments, but if I need to cancel my appointment, I will do so a minimum of 24 hours before it is scheduled. 5. I understand that this provider may stop prescribing the medications listed if:  · I do not show any improvement in pain, or my activity has not improved. · I develop rapid tolerance or loss of improvement, as described in my treatment plan. · I develop significant side effects from the medication.   · My behavior is inconsistent with the responsibilities outlined above, which may also result in my being prevented from receiving further care from this office. · Other:____________________________________________________________________    AGREEMENT:    I have read the above and have had all of my questions answered. For chronic disease management, I know that my symptoms can be managed with many types of treatments. A chronic medication trial may be part of my treatment, but I must be an active participant in my care. Medication therapy is only one part of my symptom management plan. In some cases, there may be limited scientific evidence to support the chronic use of certain medications to improve symptoms and daily function. Furthermore, in certain circumstances, there may be scientific information that suggests that use of chronic controlled substances may actually worsen my symptoms and increase my risk of unintentional death directly related to this medication therapy. I know that if my provider feels my risk from controlled medications is greater than my benefit, I will have my controlled substance medication(s) compassionately lowered or removed altogether. I agree to a controlled substance medication trial.      I further agree to allow this office to contact my HIPAA contact on file if there are concerns about my safety and use of controlled medications. I have agreed to use the following medications above as instructed by my physician and as stated in this [address].      Patient Signature:  ______________________  SOON:1/54/9590 or _____________    Provider Signature:______________________  LFMV:6/60/0291 or _____________

## 2020-04-22 RX ORDER — DEXTROAMPHETAMINE SACCHARATE, AMPHETAMINE ASPARTATE MONOHYDRATE, DEXTROAMPHETAMINE SULFATE AND AMPHETAMINE SULFATE 7.5; 7.5; 7.5; 7.5 MG/1; MG/1; MG/1; MG/1
30 CAPSULE, EXTENDED RELEASE ORAL 2 TIMES DAILY
Qty: 60 CAPSULE | Refills: 0 | Status: SHIPPED | OUTPATIENT
Start: 2020-04-22 | End: 2020-05-26 | Stop reason: SDUPTHER

## 2020-05-26 ENCOUNTER — TELEPHONE (OUTPATIENT)
Dept: FAMILY MEDICINE CLINIC | Age: 40
End: 2020-05-26

## 2020-05-26 DIAGNOSIS — F98.8 ATTENTION DEFICIT DISORDER, UNSPECIFIED HYPERACTIVITY PRESENCE: ICD-10-CM

## 2020-05-26 RX ORDER — DEXTROAMPHETAMINE SACCHARATE, AMPHETAMINE ASPARTATE MONOHYDRATE, DEXTROAMPHETAMINE SULFATE AND AMPHETAMINE SULFATE 7.5; 7.5; 7.5; 7.5 MG/1; MG/1; MG/1; MG/1
30 CAPSULE, EXTENDED RELEASE ORAL 2 TIMES DAILY
Qty: 60 CAPSULE | Refills: 0 | Status: SHIPPED | OUTPATIENT
Start: 2020-05-26 | End: 2020-06-25 | Stop reason: SDUPTHER

## 2020-06-25 RX ORDER — DEXTROAMPHETAMINE SACCHARATE, AMPHETAMINE ASPARTATE MONOHYDRATE, DEXTROAMPHETAMINE SULFATE AND AMPHETAMINE SULFATE 7.5; 7.5; 7.5; 7.5 MG/1; MG/1; MG/1; MG/1
30 CAPSULE, EXTENDED RELEASE ORAL 2 TIMES DAILY
Qty: 60 CAPSULE | Refills: 0 | Status: SHIPPED | OUTPATIENT
Start: 2020-06-25 | End: 2020-07-24 | Stop reason: SDUPTHER

## 2020-06-25 NOTE — TELEPHONE ENCOUNTER
amphetamine-dextroamphetamine (ADDERALL XR) 30 MG extended release capsule            Last Visit 3/27/20  LR 05/26/2020, #60, 0RF        Adam

## 2020-07-22 RX ORDER — METOPROLOL SUCCINATE 25 MG/1
TABLET, EXTENDED RELEASE ORAL
Qty: 90 TABLET | Refills: 1 | Status: SHIPPED | OUTPATIENT
Start: 2020-07-22 | End: 2021-01-15

## 2020-07-22 NOTE — TELEPHONE ENCOUNTER
Medication:   Requested Prescriptions     Pending Prescriptions Disp Refills    metoprolol succinate (TOPROL XL) 25 MG extended release tablet [Pharmacy Med Name: METOPROLOL SUCC ER 25 MG TAB] 90 tablet 0     Sig: TAKE ONE TABLET BY MOUTH DAILY       Last Filled:  4/20/20 #90, 0 RF     Patient Phone Number: [phone] (home) [phone] (work)    Last appt: 3/27/2020 med check   Next appt: 8/7/2020    Lab Results   Component Value Date     08/27/2018    K 5.1 08/27/2018    CL 98 (L) 08/27/2018    CO2 25 08/27/2018    BUN 17 08/27/2018    CREATININE 0.7 08/27/2018    GLUCOSE 85 08/27/2018    CALCIUM 10.4 08/27/2018    PROT 7.8 08/27/2018    LABALBU 4.7 08/27/2018    BILITOT 0.3 08/27/2018    ALKPHOS 38 (L) 08/27/2018    AST 33 08/27/2018    ALT 18 08/27/2018    LABGLOM >60 08/27/2018    GFRAA >60 08/27/2018    AGRATIO 1.5 08/27/2018    GLOB 3.1 08/27/2018

## 2020-07-24 RX ORDER — DEXTROAMPHETAMINE SACCHARATE, AMPHETAMINE ASPARTATE MONOHYDRATE, DEXTROAMPHETAMINE SULFATE AND AMPHETAMINE SULFATE 7.5; 7.5; 7.5; 7.5 MG/1; MG/1; MG/1; MG/1
30 CAPSULE, EXTENDED RELEASE ORAL 2 TIMES DAILY
Qty: 60 CAPSULE | Refills: 0 | Status: SHIPPED | OUTPATIENT
Start: 2020-07-24 | End: 2020-09-01 | Stop reason: SDUPTHER

## 2020-07-29 ENCOUNTER — TELEPHONE (OUTPATIENT)
Dept: FAMILY MEDICINE CLINIC | Age: 40
End: 2020-07-29

## 2020-07-29 NOTE — TELEPHONE ENCOUNTER
----- Message from [place] sent at 7/29/2020 11:31 AM EDT -----  Subject: Exposure to Contagious Disease    QUESTIONS  Which Disease is the patient concerned about? Other - COVID-19  When was the patient exposed? 2020-07-28  Where was the patient exposed? Home  Is the patient experiencing any symptoms? Yes  If Yes   describe symptoms? Cough   Chills   Diarrhea   Fatigue  Has there been recent travel? No  Additional Information for Provider?   ---------------------------------------------------------------------------  --------------  CALL BACK INFO  What is the best way for the office to contact you? OK to leave message on   voicemail  Preferred Call Back Phone Number?  [phone]

## 2020-07-30 NOTE — TELEPHONE ENCOUNTER
Pt says she already got it at urgent care. Ramesh Warner. . Pt  has it and she is waiting on her results. . She has an up coming apt on 8/7/20. Enrique Menjivar She is asking if she needs to change it to a vv? Please advise.

## 2020-08-05 RX ORDER — LEVOTHYROXINE SODIUM 0.05 MG/1
TABLET ORAL
Qty: 90 TABLET | Refills: 0 | Status: SHIPPED | OUTPATIENT
Start: 2020-08-05 | End: 2020-08-07 | Stop reason: SDUPTHER

## 2020-08-05 NOTE — TELEPHONE ENCOUNTER
Medication:   Requested Prescriptions     Pending Prescriptions Disp Refills    levothyroxine (SYNTHROID) 50 MCG tablet [Pharmacy Med Name: LEVOTHYROXINE 50 MCG TABLET] 90 tablet 1     Sig: TAKE ONE TABLET BY MOUTH DAILY       Last Filled:  11/5/19 #90, 2 RF     Patient Phone Number: [phone] (home) [phone] (work)    Last appt: 3/27/20 med check, anxiety   Next appt: 8/7/2020    Last Thyroid:   Lab Results   Component Value Date    TSH 1.73 10/24/2019    T4FREE 0.8 08/27/2018    X1DIWKU 5.0 10/24/2019

## 2020-08-07 ENCOUNTER — VIRTUAL VISIT (OUTPATIENT)
Dept: FAMILY MEDICINE CLINIC | Age: 40
End: 2020-08-07
Payer: COMMERCIAL

## 2020-08-07 PROCEDURE — 99213 OFFICE O/P EST LOW 20 MIN: CPT | Performed by: FAMILY MEDICINE

## 2020-08-07 RX ORDER — ONDANSETRON 4 MG/1
4 TABLET, FILM COATED ORAL EVERY 6 HOURS PRN
Qty: 30 TABLET | Refills: 1 | Status: SHIPPED | OUTPATIENT
Start: 2020-08-07 | End: 2020-11-19 | Stop reason: SDUPTHER

## 2020-08-07 RX ORDER — LEVOTHYROXINE SODIUM 0.05 MG/1
TABLET ORAL
Qty: 90 TABLET | Refills: 0 | Status: SHIPPED | OUTPATIENT
Start: 2020-08-07 | End: 2020-11-04

## 2020-08-07 RX ORDER — ALPRAZOLAM 1 MG/1
TABLET ORAL
Qty: 90 TABLET | Refills: 2 | Status: SHIPPED | OUTPATIENT
Start: 2020-08-07 | End: 2020-11-19 | Stop reason: SDUPTHER

## 2020-08-07 RX ORDER — VALACYCLOVIR HYDROCHLORIDE 1 G/1
TABLET, FILM COATED ORAL
Qty: 20 TABLET | Refills: 1 | Status: SHIPPED | OUTPATIENT
Start: 2020-08-07 | End: 2020-11-19 | Stop reason: SDUPTHER

## 2020-08-07 ASSESSMENT — PATIENT HEALTH QUESTIONNAIRE - PHQ9
SUM OF ALL RESPONSES TO PHQ QUESTIONS 1-9: 0
SUM OF ALL RESPONSES TO PHQ9 QUESTIONS 1 & 2: 0
1. LITTLE INTEREST OR PLEASURE IN DOING THINGS: 0
SUM OF ALL RESPONSES TO PHQ QUESTIONS 1-9: 0
2. FEELING DOWN, DEPRESSED OR HOPELESS: 0

## 2020-08-07 ASSESSMENT — ENCOUNTER SYMPTOMS
NAUSEA: 1
RESPIRATORY NEGATIVE: 1

## 2020-08-07 NOTE — PROGRESS NOTES
2020    TELEHEALTH EVALUATION -- Audio/Visual (During ICZFZ-62 public health emergency)    HPI:    Edmar Aguilar (:  1980) has requested an audio/video evaluation for the following concern(s):    Patient presents for 3-month checkup for ADD, anxiety, hypertension, nausea, and hypothyroid. She states she has severe menstrual cramps and is in a study with Dr. aVsyl Yip and has had lab work as well as a Pap smear and ultrasound done for the study. She states her blood pressure was normal when checked there. She states she is asymptomatic regarding her thyroid. She is due for lab in October. She does need a refill of her medication. She states her anxiety level is the same as always requiring alprazolam.  She states that Dr. Keiko Barr gave her a 2 mg alprazolam for something to do with the study however it was a one-shot dose. She states the medication for ADD is working well. She needs a refill of her Zofran. She states when she has these bad menstrual cramps her nausea is also bad. .  Review of Systems   Constitutional: Positive for fatigue. HENT: Negative. Respiratory: Negative. Cardiovascular: Negative. Gastrointestinal: Positive for nausea. Genitourinary: Positive for menstrual problem ( fatigue and pelvic pain and nausea) and pelvic pain. Musculoskeletal: Negative. Skin: Negative. Neurological: Negative. Hematological: Negative. Psychiatric/Behavioral: The patient is nervous/anxious. ADD         Prior to Visit Medications    Medication Sig Taking?  Authorizing Provider   levothyroxine (SYNTHROID) 50 MCG tablet TAKE ONE TABLET BY MOUTH DAILY Yes Simran Greenberg MD   ALPRAZolam Brown Clos) 1 MG tablet TAKE ONE TABLET BY MOUTH THREE TIMES A DAY AS NEEDED FOR ANXIETY Yes Simran Greenberg MD   valACYclovir (VALTREX) 1 g tablet TAKE TWO TABLETS BY MOUTH AT SIGN OF BREAKOUT AND IN 12 HOURS. (4 TABLETS TOTAL FOR COLD SORE) Yes Simran Greenberg MD   ondansetron (ZOFRAN) 4 MG tablet Take 1 tablet by mouth every 6 hours as needed for Nausea or Vomiting Yes Ravi Argueta MD   amphetamine-dextroamphetamine (ADDERALL XR) 30 MG extended release capsule Take 1 capsule by mouth 2 times daily for 30 days. Yes Ravi Argueta MD   metoprolol succinate (TOPROL XL) 25 MG extended release tablet TAKE ONE TABLET BY MOUTH DAILY Yes Ravi Argueta MD   hydrochlorothiazide (HYDRODIURIL) 12.5 MG tablet 1 tablet by mouth daily Yes Disha Camarillo MD   venlafaxine (EFFEXOR XR) 75 MG extended release capsule TAKE TWO CAPSULES BY MOUTH DAILY Yes Ravi Argueta MD   amLODIPine (NORVASC) 5 MG tablet TAKE ONE TABLET BY MOUTH DAILY Yes Ravi Argueta MD   ibuprofen (ADVIL;MOTRIN) 800 MG tablet 1 tab prn tid with food Yes Ravi Argueta MD       Social History     Tobacco Use    Smoking status: Never Smoker    Smokeless tobacco: Never Used   Substance Use Topics    Alcohol use: Yes     Alcohol/week: 7.0 standard drinks     Types: 7 Glasses of wine per week    Drug use: No        Allergies   Allergen Reactions    Citalopram Hydrobromide    ,   Past Medical History:   Diagnosis Date    ADD (attention deficit disorder with hyperactivity)     Anxiety     HPV (human papilloma virus) anogenital infection     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 No pertinent surgical history. ,   Social History     Tobacco Use    Smoking status: Never Smoker    Smokeless tobacco: Never Used   Substance Use Topics    Alcohol use: Yes     Alcohol/week: 7.0 standard drinks     Types: 7 Glasses of wine per week    Drug use: No   , History reviewed. No pertinent family history. ,   Immunization History   Administered Date(s) Administered    DTaP 1978, 1978, 01/12/1979, 07/11/1981, 08/19/1983    Hepatitis B 07/14/1999, 08/14/1999, 12/14/1999    Influenza Vaccine, unspecified formulation 11/01/2017    Influenza Virus Vaccine 11/03/2014, 10/01/2015, 10/21/2016, 11/01/2017    Influenza Whole 10/01/2015    Influenza, Intradermal, Preservative free 10/21/2013    Influenza, Quadv, IM, PF (6 mo and older Fluzone, Flulaval, Fluarix, and 3 yrs and older Afluria) 10/05/2015, 11/08/2018, 10/22/2019    MMR 01/21/1979, 07/09/1990    PPD Test 10/22/2019, 11/19/2019, 12/03/2019    Polio IPV (IPOL) 01/21/1979    Td, unspecified formulation 04/29/1994    Tdap (Boostrix, Adacel) 08/25/2006, 01/05/2007, 12/11/2017   ,   Health Maintenance   Topic Date Due    Potassium monitoring  08/27/2019    Creatinine monitoring  08/27/2019    Flu vaccine (1) 09/01/2020    Cervical cancer screen  05/16/2022    Lipid screen  08/27/2023    DTaP/Tdap/Td vaccine (9 - Td) 12/11/2027    HIV screen  Completed    Hepatitis A vaccine  Aged Out    Hepatitis B vaccine  Aged Out    Hib vaccine  Aged Out    Meningococcal (ACWY) vaccine  Aged Out    Pneumococcal 0-64 years Vaccine  Aged Out       PHYSICAL EXAMINATION:  [ INSTRUCTIONS:  \"[x]\" Indicates a positive item  \"[]\" Indicates a negative item  -- DELETE ALL ITEMS NOT EXAMINED]  Vital Signs: (As obtained by patient/caregiver or practitioner observation)    Respiratory rate-normal    /80 at Gyn    Constitutional: [x] Appears well-developed and well-nourished [x] No apparent distress      [] Abnormal-   Mental status  [x] Alert and awake  [x] Oriented to person/place/time [x]Able to follow commands      Eyes:  EOM    [x]  Normal  [] Abnormal-  Sclera  [x]  Normal  [] Abnormal -         Discharge [x]  None visible  [] Abnormal -    HENT:   [x] Normocephalic, atraumatic.   [] Abnormal   [] Mouth/Throat: Mucous membranes are moist.     External Ears [] Normal  [] Abnormal-   Not visualized  Neck: [x] No visualized mass     Pulmonary/Chest: [x] Respiratory effort normal.  [] No visualized signs of difficulty breathing or respiratory distress        [] Abnormal-      Musculoskeletal:   [] Normal gait with no signs of ataxia         [x] Normal range of motion of neck        [] Abnormal- Neurological:        [x] No Facial Asymmetry (Cranial nerve 7 motor function) (limited exam to video visit)          [x] No gaze palsy        [] Abnormal-         Skin:        [x] No significant exanthematous lesions or discoloration noted on facial skin         [] Abnormal-            Psychiatric:       [x] Normal Affect [x] No Hallucinations        [] Abnormal-     Other pertinent observable physical exam findings-     ASSESSMENT/PLAN:  1. Attention deficit disorder (ADD) without hyperactivity  OARRS report reviewed and no inconsistencies noted   The patient understands the risks of dependency/addiction with Adderall and will take as little as possible and discontinue as soon as possible       2. Anxiety  OARRS report reviewed and no inconsistencies noted   The patient understands the risks of dependency/addiction with alprazolam and will take as little as possible and discontinue as soon as possible     - ALPRAZolam (XANAX) 1 MG tablet; TAKE ONE TABLET BY MOUTH THREE TIMES A DAY AS NEEDED FOR ANXIETY  Dispense: 90 tablet; Refill: 2    3. Acquired hypothyroidism  aymptomatic  - levothyroxine (SYNTHROID) 50 MCG tablet; TAKE ONE TABLET BY MOUTH DAILY  Dispense: 90 tablet; Refill: 0    4. Cold sore  - valACYclovir (VALTREX) 1 g tablet; TAKE TWO TABLETS BY MOUTH AT SIGN OF BREAKOUT AND IN 12 HOURS. (4 TABLETS TOTAL FOR COLD SORE)  Dispense: 20 tablet; Refill: 1    5. Essential hypertension  The patient states stable when checked elsewhere  Continue current treatment      Nausea  -     ondansetron (ZOFRAN) 4 MG tablet; Take 1 tablet by mouth every 6 hours as needed for Nausea or Vomiting       Return in about 3 months (around 11/7/2020), or if symptoms worsen or fail to improve. Danette Martell is a 41 y.o. female being evaluated by a Virtual Visit (video visit) encounter to address concerns as mentioned above. A caregiver was present when appropriate.  Due to this being a TeleHealth encounter (During MUOOL-79 public health emergency), evaluation of the following organ systems was limited: Vitals/Constitutional/EENT/Resp/CV/GI//MS/Neuro/Skin/Heme-Lymph-Imm. Pursuant to the emergency declaration under the [facility], [address] [place] [address] authority and the [employer] Act, this Virtual Visit was conducted with patient's (and/or legal guardian's) consent, to reduce the patient's risk of exposure to COVID-19 and provide necessary medical care. The patient (and/or legal guardian) has also been advised to contact this office for worsening conditions or problems, and seek emergency medical treatment and/or call 911 if deemed necessary. Patient identification was verified at the start of the visit: Yes    Total time spent on this encounter: 20 minutes    Services were provided through a video synchronous discussion virtually to substitute for in-person clinic visit. Patient and provider were located at their individual homes. --Kayleigh Blevins MD on 8/7/2020 at 4:25 PM    An electronic signature was used to authenticate this note.

## 2020-09-01 RX ORDER — DEXTROAMPHETAMINE SACCHARATE, AMPHETAMINE ASPARTATE MONOHYDRATE, DEXTROAMPHETAMINE SULFATE AND AMPHETAMINE SULFATE 7.5; 7.5; 7.5; 7.5 MG/1; MG/1; MG/1; MG/1
30 CAPSULE, EXTENDED RELEASE ORAL 2 TIMES DAILY
Qty: 60 CAPSULE | Refills: 0 | Status: SHIPPED | OUTPATIENT
Start: 2020-09-01 | End: 2020-10-01 | Stop reason: SDUPTHER

## 2020-09-01 NOTE — TELEPHONE ENCOUNTER
----- Message from Saniya Gilmore sent at 8/31/2020  5:40 PM EDT -----  Subject: Message to Provider    QUESTIONS  Information for Provider? Patient called to refill adderal 30mg taken   twice a day . [facility] ? [facility] [address] Their is no   current medication on the list please refill and process. ---------------------------------------------------------------------------  --------------  Erick SPRINGER  What is the best way for the office to contact you? OK to leave message on   voicemail  Preferred Call Back Phone Number? [phone]  ---------------------------------------------------------------------------  --------------  SCRIPT ANSWERS  Relationship to Patient?  Self

## 2020-09-16 RX ORDER — VENLAFAXINE HYDROCHLORIDE 75 MG/1
CAPSULE, EXTENDED RELEASE ORAL
Qty: 60 CAPSULE | Refills: 5 | Status: SHIPPED | OUTPATIENT
Start: 2020-09-16 | End: 2021-03-22

## 2020-09-16 NOTE — TELEPHONE ENCOUNTER
Medication:   Requested Prescriptions     Pending Prescriptions Disp Refills    venlafaxine (EFFEXOR XR) 75 MG extended release capsule [Pharmacy Med Name: VENLAFAXINE HCL ER 75 MG CAP] 60 capsule 4     Sig: TAKE TWO CAPSULES BY MOUTH DAILY        Last Filled:  3/2/20 #60, 5 RF      Patient Phone Number: [phone] (home) [phone] (work)    Last appt: 8/7/2020 ADHD, HTN   Next appt: Visit date not found    Last OARRS:   RX Monitoring 3/27/2020   Attestation -   Periodic Controlled Substance Monitoring No signs of potential drug abuse or diversion identified.

## 2020-10-01 RX ORDER — DEXTROAMPHETAMINE SACCHARATE, AMPHETAMINE ASPARTATE MONOHYDRATE, DEXTROAMPHETAMINE SULFATE AND AMPHETAMINE SULFATE 7.5; 7.5; 7.5; 7.5 MG/1; MG/1; MG/1; MG/1
30 CAPSULE, EXTENDED RELEASE ORAL 2 TIMES DAILY
Qty: 60 CAPSULE | Refills: 0 | Status: SHIPPED | OUTPATIENT
Start: 2020-10-01 | End: 2020-11-04 | Stop reason: SDUPTHER

## 2020-11-04 RX ORDER — DEXTROAMPHETAMINE SACCHARATE, AMPHETAMINE ASPARTATE MONOHYDRATE, DEXTROAMPHETAMINE SULFATE AND AMPHETAMINE SULFATE 7.5; 7.5; 7.5; 7.5 MG/1; MG/1; MG/1; MG/1
30 CAPSULE, EXTENDED RELEASE ORAL 2 TIMES DAILY
Qty: 60 CAPSULE | Refills: 0 | Status: SHIPPED | OUTPATIENT
Start: 2020-11-04 | End: 2020-12-08 | Stop reason: SDUPTHER

## 2020-11-04 NOTE — TELEPHONE ENCOUNTER
Medication:   Requested Prescriptions     Pending Prescriptions Disp Refills    amphetamine-dextroamphetamine (ADDERALL XR) 30 MG extended release capsule 60 capsule 0     Sig: Take 1 capsule by mouth 2 times daily for 30 days. Last Filled:  10/1/20 #60, 0 RF     Patient Phone Number: [phone] (home) [phone] (work)    Last appt: 8/7/2020  ADHD, HTN   Next appt: Visit date not found    Last OARRS:   RX Monitoring 3/27/2020   Attestation -   Periodic Controlled Substance Monitoring No signs of potential drug abuse or diversion identified.

## 2020-11-04 NOTE — TELEPHONE ENCOUNTER
Medication and Quantity requested: amphetamine-dextroamphetamine (ADDERALL XR) 30 MG extended release capsule   #60     Last Visit  8/7/20    Pharmacy and phone number updated in [facility]:  Yes, Vee

## 2020-11-19 ENCOUNTER — VIRTUAL VISIT (OUTPATIENT)
Dept: FAMILY MEDICINE CLINIC | Age: 40
End: 2020-11-19
Payer: COMMERCIAL

## 2020-11-19 ENCOUNTER — TELEPHONE (OUTPATIENT)
Dept: FAMILY MEDICINE CLINIC | Age: 40
End: 2020-11-19

## 2020-11-19 PROCEDURE — 99213 OFFICE O/P EST LOW 20 MIN: CPT | Performed by: FAMILY MEDICINE

## 2020-11-19 RX ORDER — ONDANSETRON 4 MG/1
4 TABLET, FILM COATED ORAL EVERY 6 HOURS PRN
Qty: 30 TABLET | Refills: 1 | Status: SHIPPED | OUTPATIENT
Start: 2020-11-19 | End: 2021-03-25 | Stop reason: SDUPTHER

## 2020-11-19 RX ORDER — VALACYCLOVIR HYDROCHLORIDE 1 G/1
TABLET, FILM COATED ORAL
Qty: 20 TABLET | Refills: 1 | Status: SHIPPED | OUTPATIENT
Start: 2020-11-19 | End: 2021-03-25 | Stop reason: SDUPTHER

## 2020-11-19 RX ORDER — ALPRAZOLAM 1 MG/1
TABLET ORAL
Qty: 90 TABLET | Refills: 2 | Status: SHIPPED | OUTPATIENT
Start: 2020-11-19 | End: 2021-02-02 | Stop reason: SDUPTHER

## 2020-11-19 ASSESSMENT — ENCOUNTER SYMPTOMS
RESPIRATORY NEGATIVE: 1
NAUSEA: 1

## 2020-11-19 NOTE — PROGRESS NOTES
2020    TELEHEALTH EVALUATION -- Audio/Visual (During BWDHJ-79 public health emergency)    HPI:    Eduard Cogan (:  1980) has requested an audio/video evaluation for the following concern(s):    Patient returns for blood pressure checkup, ADD medication checkup and anxiety medication checkup. She states she remains working. She is somewhat distressed that as a regular exerciser she has not been going to the gym as much as she used to although does supplement with home treadmill and other exercise. She does feel the reduction of her exercise has caused her to gain some weight. She states the Adderall continues to work well and she feels like she continues to need it. Likewise she feels the alprazolam continues to work well and she continues to need that. She states her blood pressure seems to be okay although she states she stopped taking amlodipine thinking it was a medication for cholesterol. She substituted an OTC supplement for this. She is aware she needs to resume her amlodipine based on her previous blood pressure checks. Finally she needs refills of medication for her cold sores and nausea. Review of Systems   Constitutional: Negative. HENT: Negative. Respiratory: Negative. Cardiovascular: Negative. Gastrointestinal: Positive for nausea ( Off and on). Genitourinary: Negative. Musculoskeletal: Negative. Neurological: Negative. Psychiatric/Behavioral: The patient is nervous/anxious. Prior to Visit Medications    Medication Sig Taking?  Authorizing Provider   ondansetron (ZOFRAN) 4 MG tablet Take 1 tablet by mouth every 6 hours as needed for Nausea or Vomiting Yes Rachel Ziegler MD   ALPRAZolam Danae Pies) 1 MG tablet TAKE ONE TABLET BY MOUTH THREE TIMES A DAY AS NEEDED FOR ANXIETY Yes Rachel Ziegler MD   valACYclovir (VALTREX) 1 g tablet TAKE TWO TABLETS BY MOUTH AT SIGN OF BREAKOUT AND IN 12 HOURS. (4 TABLETS TOTAL FOR COLD SORE) Yes Rachel Ziegler MD levothyroxine (SYNTHROID) 50 MCG tablet TAKE ONE TABLET BY MOUTH DAILY Yes Kay Rico MD   amphetamine-dextroamphetamine (ADDERALL XR) 30 MG extended release capsule Take 1 capsule by mouth 2 times daily for 30 days. Yes Kay Rico MD   venlafaxine (EFFEXOR XR) 75 MG extended release capsule TAKE TWO CAPSULES BY MOUTH DAILY Yes Kay Rico MD   metoprolol succinate (TOPROL XL) 25 MG extended release tablet TAKE ONE TABLET BY MOUTH DAILY Yes Kay Rico MD   hydrochlorothiazide (HYDRODIURIL) 12.5 MG tablet 1 tablet by mouth daily Yes Shanti Dejesus MD   amLODIPine (NORVASC) 5 MG tablet TAKE ONE TABLET BY MOUTH DAILY Yes Kay Rico MD   ibuprofen (ADVIL;MOTRIN) 800 MG tablet 1 tab prn tid with food Yes Kay Rico MD       Social History     Tobacco Use    Smoking status: Never Smoker    Smokeless tobacco: Never Used   Substance Use Topics    Alcohol use: Yes     Alcohol/week: 7.0 standard drinks     Types: 7 Glasses of wine per week    Drug use: No        Allergies   Allergen Reactions    Citalopram Hydrobromide    ,   Past Medical History:   Diagnosis Date    ADD (attention deficit disorder with hyperactivity)     Anxiety     HPV (human papilloma virus) anogenital infection     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 No pertinent surgical history. ,   Social History     Tobacco Use    Smoking status: Never Smoker    Smokeless tobacco: Never Used   Substance Use Topics    Alcohol use: Yes     Alcohol/week: 7.0 standard drinks     Types: 7 Glasses of wine per week    Drug use: No   , History reviewed. No pertinent family history. ,   Immunization History   Administered Date(s) Administered    DTaP 1978, 1978, 01/12/1979, 07/11/1981, 08/19/1983    Hepatitis B 07/14/1999, 08/14/1999, 12/14/1999    Influenza Vaccine, unspecified formulation 11/01/2017    Influenza Virus Vaccine 11/03/2014, 10/01/2015, 10/21/2016, 11/01/2017    Influenza Whole 10/01/2015    Influenza, Asymmetry (Cranial nerve 7 motor function) (limited exam to video visit)          [x] No gaze palsy        [] Abnormal-         Skin:        [x] No significant exanthematous lesions or discoloration noted on facial skin         [] Abnormal-            Psychiatric:       [x] Normal Affect [x] No Hallucinations        [] Abnormal-     Other pertinent observable physical exam findings-     ASSESSMENT/PLAN:  1. Attention deficit disorder, unspecified hyperactivity presence  OARRS report reviewed and no inconsistencies noted   The patient understands the risks of dependency/addiction with Adderall and will take as little as possible and discontinue as soon as possible       2. Anxiety  OARRS report reviewed and no inconsistencies noted   The patient understands the risks of dependency/addiction with alprazolam and will take as little as possible and discontinue as soon as possible     - ALPRAZolam (XANAX) 1 MG tablet; TAKE ONE TABLET BY MOUTH THREE TIMES A DAY AS NEEDED FOR ANXIETY  Dispense: 90 tablet; Refill: 2    3. Essential hypertension  Patient states it is stable despite not being on amlodipine. She will resume her amlodipine and continue to check her blood pressure. 4. Nausea  - ondansetron (ZOFRAN) 4 MG tablet; Take 1 tablet by mouth every 6 hours as needed for Nausea or Vomiting  Dispense: 30 tablet; Refill: 1    5. Cold sore  - valACYclovir (VALTREX) 1 g tablet; TAKE TWO TABLETS BY MOUTH AT SIGN OF BREAKOUT AND IN 12 HOURS. (4 TABLETS TOTAL FOR COLD SORE)  Dispense: 20 tablet; Refill: 1      Return in about 3 months (around 2/19/2021), or if symptoms worsen or fail to improve, for htn, OARRS. Liborio Lock is a 41 y.o. female being evaluated by a Virtual Visit (video visit) encounter to address concerns as mentioned above. A caregiver was present when appropriate.  Due to this being a TeleHealth encounter (During [facility] public health emergency), evaluation of the following organ systems was limited: Vitals/Constitutional/EENT/Resp/CV/GI//MS/Neuro/Skin/Heme-Lymph-Imm. Pursuant to the emergency declaration under the [address] [place] [address] and the [employer] Act, this Virtual Visit was conducted with patient's (and/or legal guardian's) consent, to reduce the patient's risk of exposure to COVID-19 and provide necessary medical care. The patient (and/or legal guardian) has also been advised to contact this office for worsening conditions or problems, and seek emergency medical treatment and/or call 911 if deemed necessary. Patient identification was verified at the start of the visit: Yes    Total time spent on this encounter: 20 minutes    Services were provided through a video synchronous discussion virtually to substitute for in-person clinic visit. Patient and provider were located at their individual homes.     --Uday Cortez MD on 11/19/2020 at 6:17 PM    An electronic signature was used to authenticate this note

## 2020-12-08 RX ORDER — DEXTROAMPHETAMINE SACCHARATE, AMPHETAMINE ASPARTATE MONOHYDRATE, DEXTROAMPHETAMINE SULFATE AND AMPHETAMINE SULFATE 7.5; 7.5; 7.5; 7.5 MG/1; MG/1; MG/1; MG/1
30 CAPSULE, EXTENDED RELEASE ORAL 2 TIMES DAILY
Qty: 60 CAPSULE | Refills: 0 | Status: SHIPPED | OUTPATIENT
Start: 2020-12-08 | End: 2021-01-04 | Stop reason: SDUPTHER

## 2020-12-08 NOTE — TELEPHONE ENCOUNTER
Medication:   Requested Prescriptions     Pending Prescriptions Disp Refills    amphetamine-dextroamphetamine (ADDERALL XR) 30 MG extended release capsule 60 capsule 0     Sig: Take 1 capsule by mouth 2 times daily for 30 days. Last Filled:  11/04/2020 #60 0 rf     Patient Phone Number: [phone] (home) [phone] (work)    Last appt: 11/19/2020   Next appt: Visit date not found    Last OARRS:   RX Monitoring 11/19/2020   Attestation -   Periodic Controlled Substance Monitoring No signs of potential drug abuse or diversion identified.

## 2020-12-08 NOTE — TELEPHONE ENCOUNTER
Medication and Quantity requested: adderall xr 30m #60     Last Visit  11.19.20    Pharmacy and phone number updated in [facility]:  yes

## 2021-01-04 DIAGNOSIS — F98.8 ATTENTION DEFICIT DISORDER, UNSPECIFIED HYPERACTIVITY PRESENCE: ICD-10-CM

## 2021-01-04 RX ORDER — DEXTROAMPHETAMINE SACCHARATE, AMPHETAMINE ASPARTATE MONOHYDRATE, DEXTROAMPHETAMINE SULFATE AND AMPHETAMINE SULFATE 7.5; 7.5; 7.5; 7.5 MG/1; MG/1; MG/1; MG/1
30 CAPSULE, EXTENDED RELEASE ORAL 2 TIMES DAILY
Qty: 60 CAPSULE | Refills: 0 | Status: SHIPPED | OUTPATIENT
Start: 2021-01-04 | End: 2021-02-17 | Stop reason: SDUPTHER

## 2021-01-04 NOTE — TELEPHONE ENCOUNTER
Medication:   Requested Prescriptions     Pending Prescriptions Disp Refills    amphetamine-dextroamphetamine (ADDERALL XR) 30 MG extended release capsule 60 capsule 0     Sig: Take 1 capsule by mouth 2 times daily for 30 days. Last Filled:      Patient Phone Number: [phone] (home) [phone] (work)    Last appt: 11/19/2020   Next appt: Visit date not found    Last OARRS:   RX Monitoring 11/19/2020   Attestation -   Periodic Controlled Substance Monitoring No signs of potential drug abuse or diversion identified.

## 2021-01-15 DIAGNOSIS — I10 ESSENTIAL HYPERTENSION: ICD-10-CM

## 2021-01-15 RX ORDER — METOPROLOL SUCCINATE 25 MG/1
TABLET, EXTENDED RELEASE ORAL
Qty: 90 TABLET | Refills: 2 | Status: SHIPPED | OUTPATIENT
Start: 2021-01-15 | End: 2021-02-02 | Stop reason: SDUPTHER

## 2021-01-15 NOTE — TELEPHONE ENCOUNTER
Medication:   Requested Prescriptions     Pending Prescriptions Disp Refills    metoprolol succinate (TOPROL XL) 25 MG extended release tablet [Pharmacy Med Name: METOPROLOL SUCC ER 25 MG TAB] 90 tablet 0     Sig: TAKE ONE TABLET BY MOUTH DAILY       Last Filled:  7/22/20 #90, 1 RF     Patient Phone Number: [phone] (home) [phone] (work)    Last appt: 11/19/2020 HTN, ADHD   Next appt: Visit date not found    Lab Results   Component Value Date     08/27/2018    K 5.1 08/27/2018    CL 98 (L) 08/27/2018    CO2 25 08/27/2018    BUN 17 08/27/2018    CREATININE 0.7 08/27/2018    GLUCOSE 85 08/27/2018    CALCIUM 10.4 08/27/2018    PROT 7.8 08/27/2018    LABALBU 4.7 08/27/2018    BILITOT 0.3 08/27/2018    ALKPHOS 38 (L) 08/27/2018    AST 33 08/27/2018    ALT 18 08/27/2018    LABGLOM >60 08/27/2018    GFRAA >60 08/27/2018    AGRATIO 1.5 08/27/2018    GLOB 3.1 08/27/2018

## 2021-02-02 DIAGNOSIS — I10 ESSENTIAL HYPERTENSION: ICD-10-CM

## 2021-02-02 DIAGNOSIS — F41.9 ANXIETY: ICD-10-CM

## 2021-02-02 DIAGNOSIS — N94.6 MENSTRUAL CRAMPS: ICD-10-CM

## 2021-02-02 DIAGNOSIS — E03.9 ACQUIRED HYPOTHYROIDISM: ICD-10-CM

## 2021-02-02 RX ORDER — ALPRAZOLAM 1 MG/1
TABLET ORAL
Qty: 90 TABLET | Refills: 0 | Status: SHIPPED | OUTPATIENT
Start: 2021-02-02 | End: 2021-03-25 | Stop reason: SDUPTHER

## 2021-02-02 RX ORDER — HYDROCHLOROTHIAZIDE 12.5 MG/1
TABLET ORAL
Qty: 90 TABLET | Refills: 1 | Status: SHIPPED | OUTPATIENT
Start: 2021-02-02 | End: 2021-04-30

## 2021-02-02 RX ORDER — AMLODIPINE BESYLATE 5 MG/1
TABLET ORAL
Qty: 90 TABLET | Refills: 1 | Status: SHIPPED | OUTPATIENT
Start: 2021-02-02 | End: 2021-07-26

## 2021-02-02 RX ORDER — IBUPROFEN 800 MG/1
TABLET ORAL
Qty: 100 TABLET | Refills: 1 | Status: SHIPPED | OUTPATIENT
Start: 2021-02-02 | End: 2021-07-29 | Stop reason: SDUPTHER

## 2021-02-02 RX ORDER — METOPROLOL SUCCINATE 25 MG/1
TABLET, EXTENDED RELEASE ORAL
Qty: 90 TABLET | Refills: 1 | Status: SHIPPED | OUTPATIENT
Start: 2021-02-02 | End: 2021-10-22

## 2021-02-02 RX ORDER — LEVOTHYROXINE SODIUM 0.05 MG/1
TABLET ORAL
Qty: 90 TABLET | Refills: 1 | Status: SHIPPED | OUTPATIENT
Start: 2021-02-02 | End: 2021-04-30

## 2021-02-02 NOTE — TELEPHONE ENCOUNTER
Medication and Quantity requested:   alprazolam 1mg #270  Amlodipine 5mg #90  Levothyroxine 50 mcg #90  hctz 12.5 #90  Ibuprofen 800mg #270  Metoprolol 2mg #90     Last Visit  11.19.20    Pharmacy and phone number updated in [facility]:  Yes    [facility]

## 2021-02-02 NOTE — TELEPHONE ENCOUNTER
Scripts need to be sent to different pharmacy    norvasc  1/25/21 #90 r 1     Metoprolol  1/15/21 #90 R 2     Levothyroxine  11/4/20 #90    Xanax  11/19/20 #90  R2    hctz  3/27/20 #90 R 3    Ibuprofen  8/20/19 #100 R 3

## 2021-02-17 DIAGNOSIS — F98.8 ATTENTION DEFICIT DISORDER, UNSPECIFIED HYPERACTIVITY PRESENCE: ICD-10-CM

## 2021-02-17 RX ORDER — DEXTROAMPHETAMINE SACCHARATE, AMPHETAMINE ASPARTATE MONOHYDRATE, DEXTROAMPHETAMINE SULFATE AND AMPHETAMINE SULFATE 7.5; 7.5; 7.5; 7.5 MG/1; MG/1; MG/1; MG/1
30 CAPSULE, EXTENDED RELEASE ORAL 2 TIMES DAILY
Qty: 60 CAPSULE | Refills: 0 | Status: SHIPPED | OUTPATIENT
Start: 2021-02-17 | End: 2021-03-19 | Stop reason: SDUPTHER

## 2021-02-17 NOTE — TELEPHONE ENCOUNTER
Medication and Quantity requested:  Adderall 30mg XR    Last Visit  11-19-20, [address] and phone number updated in [facility]:  Jenn Longoria

## 2021-03-19 DIAGNOSIS — F98.8 ATTENTION DEFICIT DISORDER, UNSPECIFIED HYPERACTIVITY PRESENCE: ICD-10-CM

## 2021-03-19 RX ORDER — DEXTROAMPHETAMINE SACCHARATE, AMPHETAMINE ASPARTATE MONOHYDRATE, DEXTROAMPHETAMINE SULFATE AND AMPHETAMINE SULFATE 7.5; 7.5; 7.5; 7.5 MG/1; MG/1; MG/1; MG/1
30 CAPSULE, EXTENDED RELEASE ORAL 2 TIMES DAILY
Qty: 60 CAPSULE | Refills: 0 | Status: SHIPPED | OUTPATIENT
Start: 2021-03-19 | End: 2021-04-26 | Stop reason: SDUPTHER

## 2021-03-22 DIAGNOSIS — F34.1 DYSTHYMIA: ICD-10-CM

## 2021-03-22 RX ORDER — VENLAFAXINE HYDROCHLORIDE 75 MG/1
CAPSULE, EXTENDED RELEASE ORAL
Qty: 60 CAPSULE | Refills: 5 | Status: SHIPPED | OUTPATIENT
Start: 2021-03-22 | End: 2021-09-23 | Stop reason: SDUPTHER

## 2021-03-25 ENCOUNTER — VIRTUAL VISIT (OUTPATIENT)
Dept: FAMILY MEDICINE CLINIC | Age: 41
End: 2021-03-25
Payer: COMMERCIAL

## 2021-03-25 DIAGNOSIS — I10 ESSENTIAL HYPERTENSION: Primary | ICD-10-CM

## 2021-03-25 DIAGNOSIS — F98.8 ATTENTION DEFICIT DISORDER, UNSPECIFIED HYPERACTIVITY PRESENCE: ICD-10-CM

## 2021-03-25 DIAGNOSIS — F41.9 ANXIETY: ICD-10-CM

## 2021-03-25 DIAGNOSIS — B00.1 COLD SORE: ICD-10-CM

## 2021-03-25 DIAGNOSIS — F34.1 DYSTHYMIA: ICD-10-CM

## 2021-03-25 DIAGNOSIS — R11.0 NAUSEA: ICD-10-CM

## 2021-03-25 PROCEDURE — 99213 OFFICE O/P EST LOW 20 MIN: CPT | Performed by: FAMILY MEDICINE

## 2021-03-25 RX ORDER — ALPRAZOLAM 1 MG/1
TABLET ORAL
Qty: 90 TABLET | Refills: 2 | Status: SHIPPED | OUTPATIENT
Start: 2021-03-25 | End: 2021-07-29 | Stop reason: SDUPTHER

## 2021-03-25 RX ORDER — VALACYCLOVIR HYDROCHLORIDE 1 G/1
TABLET, FILM COATED ORAL
Qty: 20 TABLET | Refills: 3 | Status: SHIPPED | OUTPATIENT
Start: 2021-03-25 | End: 2021-07-29 | Stop reason: SDUPTHER

## 2021-03-25 RX ORDER — ONDANSETRON 4 MG/1
4 TABLET, FILM COATED ORAL EVERY 6 HOURS PRN
Qty: 30 TABLET | Refills: 3 | Status: SHIPPED | OUTPATIENT
Start: 2021-03-25 | End: 2021-07-29 | Stop reason: SDUPTHER

## 2021-03-25 ASSESSMENT — ENCOUNTER SYMPTOMS
NAUSEA: 1
RESPIRATORY NEGATIVE: 1

## 2021-03-25 ASSESSMENT — PATIENT HEALTH QUESTIONNAIRE - PHQ9
2. FEELING DOWN, DEPRESSED OR HOPELESS: 0
SUM OF ALL RESPONSES TO PHQ QUESTIONS 1-9: 0
SUM OF ALL RESPONSES TO PHQ QUESTIONS 1-9: 0

## 2021-03-25 NOTE — PROGRESS NOTES
3/25/2021    TELEHEALTH EVALUATION -- Audio/Visual (During [facility]-45 public health emergency)    HPI:    Thania Melgar (:  1980) has requested an audio/video evaluation for the following concern(s):    Patient returns for blood pressure checkup, ADD medication checkup and anxiety medication checkup. She states she remains working. She is regular exerciser and has started going back to the gym with a . She has  been going to the gym 3 xs per week which she states has greatly improved her mental outlook and also helped her not to gain weight. She states the Adderall continues to work well and she feels like she continues to need it. Likewise she feels the alprazolam continues to work well and she continues to need that. She states her blood pressure seems to be okay and she checks occasionally at work. On the last visit she had stopped the amlodipine however is taking the amlodipine, metoprolol, and hydrochlorothiazide. She feels her dysthymia is controlled on venlafaxine. .  Finally she needs refills of medication for her cold sores and nausea. Review of Systems   Constitutional: Positive for activity change ( She has resumed going to her gym since last seen). HENT: Negative. Respiratory: Negative. Cardiovascular: Negative. Gastrointestinal: Positive for nausea ( Off and on). Endocrine: Negative. Genitourinary: Negative. Musculoskeletal: Negative. Neurological: Negative. Hematological: Negative. Psychiatric/Behavioral: Positive for dysphoric mood. The patient is nervous/anxious. Prior to Visit Medications    Medication Sig Taking?  Authorizing Provider   ALPRAZolam Nubia Poncho) 1 MG tablet TAKE ONE TABLET BY MOUTH THREE TIMES A DAY AS NEEDED FOR ANXIETY Yes Rebekah Peters MD   valACYclovir (VALTREX) 1 g tablet TAKE TWO TABLETS BY MOUTH AT SIGN OF BREAKOUT AND IN 12 HOURS. (4 TABLETS TOTAL FOR COLD SORE) Yes Rebekah Peters MD   ondansetron (ZOFRAN) 4 MG tablet Take 1 tablet by mouth every 6 hours as needed for Nausea or Vomiting Yes Amanda Alaniz MD   venlafaxine (EFFEXOR XR) 75 MG extended release capsule TAKE TWO CAPSULES BY MOUTH DAILY Yes Amanda Alaniz MD   amphetamine-dextroamphetamine (ADDERALL XR) 30 MG extended release capsule Take 1 capsule by mouth 2 times daily for 30 days. Yes Amanda Alaniz MD   amLODIPine (NORVASC) 5 MG tablet TAKE ONE TABLET BY MOUTH DAILY Yes Amanda Alaniz MD   metoprolol succinate (TOPROL XL) 25 MG extended release tablet TAKE ONE TABLET BY MOUTH DAILY Yes Amanda Alaniz MD   hydroCHLOROthiazide (HYDRODIURIL) 12.5 MG tablet 1 tablet by mouth daily Yes Amnada Alaniz MD   ibuprofen (ADVIL;MOTRIN) 800 MG tablet 1 tab prn tid with food Yes Amanda Alaniz MD   levothyroxine (SYNTHROID) 50 MCG tablet Take one tablet by mouth daily Yes Amanda Alaniz MD       Social History     Tobacco Use    Smoking status: Never Smoker    Smokeless tobacco: Never Used   Substance Use Topics    Alcohol use: Yes     Alcohol/week: 7.0 standard drinks     Types: 7 Glasses of wine per week    Drug use: No        Allergies   Allergen Reactions    Citalopram Hydrobromide    ,   Past Medical History:   Diagnosis Date    ADD (attention deficit disorder with hyperactivity)     Anxiety     HPV (human papilloma virus) anogenital infection     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 No pertinent surgical history. ,   Social History     Tobacco Use    Smoking status: Never Smoker    Smokeless tobacco: Never Used   Substance Use Topics    Alcohol use: Yes     Alcohol/week: 7.0 standard drinks     Types: 7 Glasses of wine per week    Drug use: No   , History reviewed. No pertinent family history. ,   Immunization History   Administered Date(s) Administered    DTaP 1980, 1978, 01/12/1979, 07/11/1981, 08/19/1983    Hepatitis B 07/14/1999, 08/14/1999, 12/14/1999    Influenza Vaccine, unspecified formulation 11/01/2017    Influenza Virus Vaccine 11/03/2014, 10/01/2015, 10/21/2016, 11/01/2017    Influenza Whole 10/01/2015    Influenza, Intradermal, Preservative free 10/21/2013    Influenza, Gabriel Redder, IM, PF (6 mo and older Fluzone, Flulaval, Fluarix, and 3 yrs and older Afluria) 10/05/2015, 11/08/2018, 10/22/2019    MMR 01/21/1979, 07/09/1990    PPD Test 10/22/2019, 11/19/2019, 12/03/2019    Polio IPV (IPOL) 01/21/1979    Td, unspecified formulation 04/29/1994    Tdap (Boostrix, Adacel) 08/25/2006, 01/05/2007, 12/11/2017   ,   Health Maintenance   Topic Date Due    Hepatitis C screen  Never done    COVID-19 Vaccine (1) Never done    Potassium monitoring  08/27/2019    Creatinine monitoring  08/27/2019    Cervical cancer screen  05/16/2022    Lipid screen  08/27/2023    DTaP/Tdap/Td vaccine (9 - Td) 12/11/2027    Flu vaccine  Completed    HIV screen  Completed    Hepatitis A vaccine  Aged Out    Hepatitis B vaccine  Aged Out    Hib vaccine  Aged Out    Meningococcal (ACWY) vaccine  Aged Out    Pneumococcal 0-64 years Vaccine  Aged Out       PHYSICAL EXAMINATION:  [ INSTRUCTIONS:  \"[x]\" Indicates a positive item  \"[]\" Indicates a negative item  -- DELETE ALL ITEMS NOT EXAMINED]  Vital Signs: (As obtained by patient/caregiver or practitioner observation)    Respiratory ratenormal         Constitutional: [x] Appears well-developed and well-nourished [x] No apparent distress      [] Abnormal-   Mental status  [x] Alert and awake  [x] Oriented to person/place/time [x]Able to follow commands      Eyes:  EOM    [x]  Normal  [] Abnormal-  Sclera  [x]  Normal  [] Abnormal -         Discharge [x]  None visible  [] Abnormal -    HENT:   [x] Normocephalic, atraumatic.   [] Abnormal   [] Mouth/Throat: Mucous membranes are moist.     External Ears [x] Normal  [] Abnormal-     Neck: [x] No visualized mass     Pulmonary/Chest: [x] Respiratory effort normal.  [] No visualized signs of difficulty breathing or respiratory distress        [] Abnormal- Musculoskeletal:   [x] Normal gait with no signs of ataxia         [x] Normal range of motion of neck        [] Abnormal-       Neurological:        [x] No Facial Asymmetry (Cranial nerve 7 motor function) (limited exam to video visit)          [x] No gaze palsy        [] Abnormal-         Skin:        [x] No significant exanthematous lesions or discoloration noted on facial skin         [] Abnormal-            Psychiatric:       [x] Normal Affect [x] No Hallucinations        [] Abnormal-     Other pertinent observable physical exam findings-     ASSESSMENT/PLAN:  1. Essential hypertension  Patient states her blood pressure has been normal on the above 3 medications. Continue current treatment. Home blood pressure checks. Recommended a face-to-face office visit for her next checkup so we can check her blood pressure in the office. 2. Anxiety  OARRS report reviewed and no inconsistencies noted   The patient understands the risks of dependency/addiction with alprazolam and will take as little as possible and discontinue as soon as possible     - ALPRAZolam (XANAX) 1 MG tablet; TAKE ONE TABLET BY MOUTH THREE TIMES A DAY AS NEEDED FOR ANXIETY  Dispense: 90 tablet; Refill: 2    3. Attention deficit disorder, unspecified hyperactivity presence  OARRS report reviewed and no inconsistencies noted   The patient understands the risks of dependency/addiction with Adderall and will take as little as possible and discontinue as soon as possible       4. Dysthymia  Appears stable on venlafaxine. Continue current treatment    5. Cold sore  Refill  - valACYclovir (VALTREX) 1 g tablet; TAKE TWO TABLETS BY MOUTH AT SIGN OF BREAKOUT AND IN 12 HOURS. (4 TABLETS TOTAL FOR COLD SORE)  Dispense: 20 tablet; Refill: 3    6. Nausea  Refill  - ondansetron (ZOFRAN) 4 MG tablet; Take 1 tablet by mouth every 6 hours as needed for Nausea or Vomiting  Dispense: 30 tablet;  Refill: 3      Return in about 3 months (around 6/25/2021), or if symptoms worsen or fail to improve. Severa Ree is a 41 y.o. female being evaluated by a Virtual Visit (video visit) encounter to address concerns as mentioned above. A caregiver was present when appropriate. Due to this being a TeleHealth encounter ([facility]), evaluation of the following organ systems was limited: Vitals/Constitutional/EENT/Resp/CV/GI//MS/Neuro/Skin/Heme-Lymph-Imm. Pursuant to the emergency declaration under the [address] [place] [address], [address] [place] [address] authority and the [employer] Act, this Virtual Visit was conducted with patient's (and/or legal guardian's) consent, to reduce the patient's risk of exposure to COVID-19 and provide necessary medical care. The patient (and/or legal guardian) has also been advised to contact this office for worsening conditions or problems, and seek emergency medical treatment and/or call 911 if deemed necessary. Patient identification was verified at the start of the visit: Yes    Total time spent on this encounter: 20 minutes    Services were provided through a video synchronous discussion virtually to substitute for in-person clinic visit. Patient and provider were located at their individual homes. --Rita Adam MD on 3/25/2021 at 12:53 PM    An electronic signature was used to authenticate this note.

## 2021-04-26 DIAGNOSIS — F98.8 ATTENTION DEFICIT DISORDER, UNSPECIFIED HYPERACTIVITY PRESENCE: ICD-10-CM

## 2021-04-26 RX ORDER — DEXTROAMPHETAMINE SACCHARATE, AMPHETAMINE ASPARTATE MONOHYDRATE, DEXTROAMPHETAMINE SULFATE AND AMPHETAMINE SULFATE 7.5; 7.5; 7.5; 7.5 MG/1; MG/1; MG/1; MG/1
30 CAPSULE, EXTENDED RELEASE ORAL 2 TIMES DAILY
Qty: 60 CAPSULE | Refills: 0 | Status: SHIPPED | OUTPATIENT
Start: 2021-04-26 | End: 2021-06-02 | Stop reason: SDUPTHER

## 2021-04-26 NOTE — TELEPHONE ENCOUNTER
Medication:   Requested Prescriptions     Pending Prescriptions Disp Refills    amphetamine-dextroamphetamine (ADDERALL XR) 30 MG extended release capsule 60 capsule 0     Sig: Take 1 capsule by mouth 2 times daily for 30 days. Last Filled:      Patient Phone Number: [phone] (home) [phone] (work)    Last appt: 3/25/2021   Next appt: Visit date not found    Last OARRS:   RX Monitoring 3/25/2021   Attestation -   Periodic Controlled Substance Monitoring No signs of potential drug abuse or diversion identified.

## 2021-04-30 DIAGNOSIS — E03.9 ACQUIRED HYPOTHYROIDISM: ICD-10-CM

## 2021-04-30 RX ORDER — LEVOTHYROXINE SODIUM 0.05 MG/1
TABLET ORAL
Qty: 90 TABLET | Refills: 0 | Status: SHIPPED | OUTPATIENT
Start: 2021-04-30 | End: 2021-07-26

## 2021-04-30 NOTE — TELEPHONE ENCOUNTER
Patient returned call and was advised that she is overdue for labs.  She expressed understanding and will go to [facility] in [facility] next week to have blood drawn

## 2021-06-02 DIAGNOSIS — F98.8 ATTENTION DEFICIT DISORDER, UNSPECIFIED HYPERACTIVITY PRESENCE: ICD-10-CM

## 2021-06-02 RX ORDER — DEXTROAMPHETAMINE SACCHARATE, AMPHETAMINE ASPARTATE MONOHYDRATE, DEXTROAMPHETAMINE SULFATE AND AMPHETAMINE SULFATE 7.5; 7.5; 7.5; 7.5 MG/1; MG/1; MG/1; MG/1
30 CAPSULE, EXTENDED RELEASE ORAL 2 TIMES DAILY
Qty: 60 CAPSULE | Refills: 0 | Status: SHIPPED | OUTPATIENT
Start: 2021-06-02 | End: 2021-07-07 | Stop reason: SDUPTHER

## 2021-07-07 DIAGNOSIS — F98.8 ATTENTION DEFICIT DISORDER, UNSPECIFIED HYPERACTIVITY PRESENCE: ICD-10-CM

## 2021-07-07 RX ORDER — DEXTROAMPHETAMINE SACCHARATE, AMPHETAMINE ASPARTATE MONOHYDRATE, DEXTROAMPHETAMINE SULFATE AND AMPHETAMINE SULFATE 7.5; 7.5; 7.5; 7.5 MG/1; MG/1; MG/1; MG/1
30 CAPSULE, EXTENDED RELEASE ORAL 2 TIMES DAILY
Qty: 60 CAPSULE | Refills: 0 | Status: SHIPPED | OUTPATIENT
Start: 2021-07-07 | End: 2021-07-29 | Stop reason: SDUPTHER

## 2021-07-24 DIAGNOSIS — I10 ESSENTIAL HYPERTENSION: ICD-10-CM

## 2021-07-25 DIAGNOSIS — I10 ESSENTIAL HYPERTENSION: ICD-10-CM

## 2021-07-25 DIAGNOSIS — E03.9 ACQUIRED HYPOTHYROIDISM: ICD-10-CM

## 2021-07-26 RX ORDER — AMLODIPINE BESYLATE 5 MG/1
TABLET ORAL
Qty: 90 TABLET | Refills: 1 | Status: SHIPPED | OUTPATIENT
Start: 2021-07-26 | End: 2022-04-21

## 2021-07-26 RX ORDER — LEVOTHYROXINE SODIUM 0.05 MG/1
TABLET ORAL
Qty: 90 TABLET | Refills: 1 | Status: SHIPPED | OUTPATIENT
Start: 2021-07-26 | End: 2022-01-03

## 2021-07-26 RX ORDER — HYDROCHLOROTHIAZIDE 12.5 MG/1
TABLET ORAL
Qty: 90 TABLET | Refills: 1 | Status: SHIPPED | OUTPATIENT
Start: 2021-07-26 | End: 2022-01-03

## 2021-07-26 NOTE — TELEPHONE ENCOUNTER
Medication:   Requested Prescriptions     Pending Prescriptions Disp Refills    amLODIPine (NORVASC) 5 MG tablet [Pharmacy Med Name: amLODIPine BESYLATE 5 MG TAB] 30 tablet 0     Sig: TAKE ONE TABLET BY MOUTH DAILY       Last Filled:  2/2/21 #90, 1 RF     Patient Phone Number: [phone] (home) [phone] (work)    Last appt: 3/25/2021  HTN   Next appt: 7/25/2021    Lab Results   Component Value Date     08/27/2018    K 5.1 08/27/2018    CL 98 (L) 08/27/2018    CO2 25 08/27/2018    BUN 17 08/27/2018    CREATININE 0.7 08/27/2018    GLUCOSE 85 08/27/2018    CALCIUM 10.4 08/27/2018    PROT 7.8 08/27/2018    LABALBU 4.7 08/27/2018    BILITOT 0.3 08/27/2018    ALKPHOS 38 (L) 08/27/2018    AST 33 08/27/2018    ALT 18 08/27/2018    LABGLOM >60 08/27/2018    GFRAA >60 08/27/2018    AGRATIO 1.5 08/27/2018    GLOB 3.1 08/27/2018

## 2021-07-26 NOTE — TELEPHONE ENCOUNTER
Medication:   Requested Prescriptions     Pending Prescriptions Disp Refills    levothyroxine (SYNTHROID) 50 MCG tablet [Pharmacy Med Name: LEVOTHYROXINE 50 MCG TABLET] 90 tablet 0     Sig: TAKE ONE TABLET BY MOUTH DAILY    hydroCHLOROthiazide (HYDRODIURIL) 12.5 MG tablet [Pharmacy Med Name: hydroCHLOROthiazide 12.5 MG TABLET] 90 tablet 0     Sig: TAKE ONE TABLET BY MOUTH DAILY       Last Filled:  4/30/21 #90, 0 RF     Patient Phone Number: [phone] (home) [phone] (work)    Last appt: 3/25/2021 med refill, HTN    Next appt: 7/29/2021    Lab Results   Component Value Date     08/27/2018    K 5.1 08/27/2018    CL 98 (L) 08/27/2018    CO2 25 08/27/2018    BUN 17 08/27/2018    CREATININE 0.7 08/27/2018    GLUCOSE 85 08/27/2018    CALCIUM 10.4 08/27/2018    PROT 7.8 08/27/2018    LABALBU 4.7 08/27/2018    BILITOT 0.3 08/27/2018    ALKPHOS 38 (L) 08/27/2018    AST 33 08/27/2018    ALT 18 08/27/2018    LABGLOM >60 08/27/2018    GFRAA >60 08/27/2018    AGRATIO 1.5 08/27/2018    GLOB 3.1 08/27/2018

## 2021-07-29 ENCOUNTER — OFFICE VISIT (OUTPATIENT)
Dept: FAMILY MEDICINE CLINIC | Age: 41
End: 2021-07-29
Payer: COMMERCIAL

## 2021-07-29 VITALS
WEIGHT: 129.2 LBS | HEART RATE: 90 BPM | HEIGHT: 61 IN | SYSTOLIC BLOOD PRESSURE: 134 MMHG | BODY MASS INDEX: 24.39 KG/M2 | DIASTOLIC BLOOD PRESSURE: 72 MMHG

## 2021-07-29 DIAGNOSIS — F98.8 ATTENTION DEFICIT DISORDER, UNSPECIFIED HYPERACTIVITY PRESENCE: ICD-10-CM

## 2021-07-29 DIAGNOSIS — R11.0 NAUSEA: ICD-10-CM

## 2021-07-29 DIAGNOSIS — Z11.59 ENCOUNTER FOR HEPATITIS C SCREENING TEST FOR LOW RISK PATIENT: ICD-10-CM

## 2021-07-29 DIAGNOSIS — N18.2 CRF (CHRONIC RENAL FAILURE), STAGE 2 (MILD): Primary | ICD-10-CM

## 2021-07-29 DIAGNOSIS — I10 ESSENTIAL HYPERTENSION: Primary | ICD-10-CM

## 2021-07-29 DIAGNOSIS — B00.1 COLD SORE: ICD-10-CM

## 2021-07-29 DIAGNOSIS — F41.9 ANXIETY: ICD-10-CM

## 2021-07-29 DIAGNOSIS — H10.13 ALLERGIC CONJUNCTIVITIS OF BOTH EYES: ICD-10-CM

## 2021-07-29 DIAGNOSIS — N94.6 MENSTRUAL CRAMPS: ICD-10-CM

## 2021-07-29 DIAGNOSIS — E03.9 ACQUIRED HYPOTHYROIDISM: ICD-10-CM

## 2021-07-29 DIAGNOSIS — I10 ESSENTIAL HYPERTENSION: ICD-10-CM

## 2021-07-29 LAB
A/G RATIO: 1.9 (ref 1.1–2.2)
ALBUMIN SERPL-MCNC: 4.7 G/DL (ref 3.4–5)
ALP BLD-CCNC: 50 U/L (ref 40–129)
ALT SERPL-CCNC: 16 U/L (ref 10–40)
ANION GAP SERPL CALCULATED.3IONS-SCNC: 11 MMOL/L (ref 3–16)
AST SERPL-CCNC: 22 U/L (ref 15–37)
BASOPHILS ABSOLUTE: 0.1 K/UL (ref 0–0.2)
BASOPHILS RELATIVE PERCENT: 0.7 %
BILIRUB SERPL-MCNC: <0.2 MG/DL (ref 0–1)
BUN BLDV-MCNC: 25 MG/DL (ref 7–20)
CALCIUM SERPL-MCNC: 11.2 MG/DL (ref 8.3–10.6)
CHLORIDE BLD-SCNC: 100 MMOL/L (ref 99–110)
CHOLESTEROL, TOTAL: 186 MG/DL (ref 0–199)
CO2: 25 MMOL/L (ref 21–32)
CREAT SERPL-MCNC: 1.4 MG/DL (ref 0.6–1.1)
EOSINOPHILS ABSOLUTE: 0.2 K/UL (ref 0–0.6)
EOSINOPHILS RELATIVE PERCENT: 2.2 %
GFR AFRICAN AMERICAN: 50
GFR NON-AFRICAN AMERICAN: 41
GLOBULIN: 2.5 G/DL
GLUCOSE BLD-MCNC: 86 MG/DL (ref 70–99)
HCT VFR BLD CALC: 40.3 % (ref 36–48)
HDLC SERPL-MCNC: 114 MG/DL (ref 40–60)
HEMOGLOBIN: 13.8 G/DL (ref 12–16)
LDL CHOLESTEROL CALCULATED: 61 MG/DL
LYMPHOCYTES ABSOLUTE: 1.3 K/UL (ref 1–5.1)
LYMPHOCYTES RELATIVE PERCENT: 13 %
MCH RBC QN AUTO: 34.9 PG (ref 26–34)
MCHC RBC AUTO-ENTMCNC: 34.4 G/DL (ref 31–36)
MCV RBC AUTO: 101.6 FL (ref 80–100)
MONOCYTES ABSOLUTE: 1.3 K/UL (ref 0–1.3)
MONOCYTES RELATIVE PERCENT: 12.6 %
NEUTROPHILS ABSOLUTE: 7.1 K/UL (ref 1.7–7.7)
NEUTROPHILS RELATIVE PERCENT: 71.5 %
PDW BLD-RTO: 13.5 % (ref 12.4–15.4)
PLATELET # BLD: 251 K/UL (ref 135–450)
PMV BLD AUTO: 9.1 FL (ref 5–10.5)
POTASSIUM SERPL-SCNC: 4.5 MMOL/L (ref 3.5–5.1)
RBC # BLD: 3.97 M/UL (ref 4–5.2)
SODIUM BLD-SCNC: 136 MMOL/L (ref 136–145)
TOTAL PROTEIN: 7.2 G/DL (ref 6.4–8.2)
TRIGL SERPL-MCNC: 53 MG/DL (ref 0–150)
TSH REFLEX: 3.19 UIU/ML (ref 0.27–4.2)
VLDLC SERPL CALC-MCNC: 11 MG/DL
WBC # BLD: 9.9 K/UL (ref 4–11)

## 2021-07-29 PROCEDURE — 99213 OFFICE O/P EST LOW 20 MIN: CPT | Performed by: FAMILY MEDICINE

## 2021-07-29 RX ORDER — OLOPATADINE HYDROCHLORIDE 1 MG/ML
1 SOLUTION/ DROPS OPHTHALMIC 2 TIMES DAILY
Qty: 1 BOTTLE | Refills: 1 | Status: SHIPPED | OUTPATIENT
Start: 2021-07-29 | End: 2021-08-28

## 2021-07-29 RX ORDER — ONDANSETRON 4 MG/1
4 TABLET, FILM COATED ORAL EVERY 6 HOURS PRN
Qty: 30 TABLET | Refills: 3 | Status: SHIPPED | OUTPATIENT
Start: 2021-07-29 | End: 2022-08-26 | Stop reason: SDUPTHER

## 2021-07-29 RX ORDER — ALPRAZOLAM 1 MG/1
TABLET ORAL
Qty: 90 TABLET | Refills: 2 | Status: SHIPPED | OUTPATIENT
Start: 2021-07-29 | End: 2022-01-25 | Stop reason: SDUPTHER

## 2021-07-29 RX ORDER — VALACYCLOVIR HYDROCHLORIDE 1 G/1
TABLET, FILM COATED ORAL
Qty: 20 TABLET | Refills: 3 | Status: SHIPPED | OUTPATIENT
Start: 2021-07-29 | End: 2022-03-21

## 2021-07-29 RX ORDER — DEXTROAMPHETAMINE SACCHARATE, AMPHETAMINE ASPARTATE MONOHYDRATE, DEXTROAMPHETAMINE SULFATE AND AMPHETAMINE SULFATE 7.5; 7.5; 7.5; 7.5 MG/1; MG/1; MG/1; MG/1
30 CAPSULE, EXTENDED RELEASE ORAL 2 TIMES DAILY
Qty: 60 CAPSULE | Refills: 0 | Status: SHIPPED | OUTPATIENT
Start: 2021-07-29 | End: 2021-09-03 | Stop reason: SDUPTHER

## 2021-07-29 RX ORDER — IBUPROFEN 800 MG/1
TABLET ORAL
Qty: 100 TABLET | Refills: 5 | Status: SHIPPED | OUTPATIENT
Start: 2021-07-29 | End: 2022-08-26 | Stop reason: SDUPTHER

## 2021-07-29 SDOH — ECONOMIC STABILITY: FOOD INSECURITY: WITHIN THE PAST 12 MONTHS, THE FOOD YOU BOUGHT JUST DIDN'T LAST AND YOU DIDN'T HAVE MONEY TO GET MORE.: NEVER TRUE

## 2021-07-29 SDOH — ECONOMIC STABILITY: FOOD INSECURITY: WITHIN THE PAST 12 MONTHS, YOU WORRIED THAT YOUR FOOD WOULD RUN OUT BEFORE YOU GOT MONEY TO BUY MORE.: NEVER TRUE

## 2021-07-29 ASSESSMENT — SOCIAL DETERMINANTS OF HEALTH (SDOH): HOW HARD IS IT FOR YOU TO PAY FOR THE VERY BASICS LIKE FOOD, HOUSING, MEDICAL CARE, AND HEATING?: SOMEWHAT HARD

## 2021-07-29 NOTE — PROGRESS NOTES
Subjective:   Arleth Armstrong is a 35 y.o. female with hypertension. Hypertension ROS: taking medications as instructed, no medication side effects noted, no TIA's, no chest pain at rest or on exertion, no SOB or dyspnea on exertion, no swelling of ankles or feet. Exercise:goes to  three times a week  New concerns: complains of allergic conjunctivitis. Needs refills for her menstrual cramps, nausea, and fever blisters. .   Home BP: within normal limits   Anxiety: OARRS check: continues with anxiety and needs medication  ADD: OARRS check: continues with anxiety and needs medication      Outpatient Medications Marked as Taking for the 7/29/21 encounter (Office Visit) with David Cannon MD   Medication Sig Dispense Refill    ALPRAZolam (XANAX) 1 MG tablet TAKE ONE TABLET BY MOUTH THREE TIMES A DAY AS NEEDED FOR ANXIETY 90 tablet 2    ibuprofen (ADVIL;MOTRIN) 800 MG tablet 1 tab prn tid with food 100 tablet 5    amphetamine-dextroamphetamine (ADDERALL XR) 30 MG extended release capsule Take 1 capsule by mouth 2 times daily for 30 days.  60 capsule 0    ondansetron (ZOFRAN) 4 MG tablet Take 1 tablet by mouth every 6 hours as needed for Nausea or Vomiting 30 tablet 3    valACYclovir (VALTREX) 1 g tablet TAKE TWO TABLETS BY MOUTH AT SIGN OF BREAKOUT AND IN 12 HOURS. (4 TABLETS TOTAL FOR COLD SORE) 20 tablet 3    olopatadine (PATANOL) 0.1 % ophthalmic solution Place 1 drop into both eyes 2 times daily 1 Bottle 1    amLODIPine (NORVASC) 5 MG tablet TAKE ONE TABLET BY MOUTH DAILY 90 tablet 1    levothyroxine (SYNTHROID) 50 MCG tablet TAKE ONE TABLET BY MOUTH DAILY 90 tablet 1    hydroCHLOROthiazide (HYDRODIURIL) 12.5 MG tablet TAKE ONE TABLET BY MOUTH DAILY 90 tablet 1    venlafaxine (EFFEXOR XR) 75 MG extended release capsule TAKE TWO CAPSULES BY MOUTH DAILY 60 capsule 5    metoprolol succinate (TOPROL XL) 25 MG extended release tablet TAKE ONE TABLET BY MOUTH DAILY 90 tablet 1     Allergies Allergen Reactions    Citalopram Hydrobromide        Objective:   /72   Pulse 90   Ht 5' 1\" (1.549 m)   Wt 129 lb 3.2 oz (58.6 kg)   LMP  (Exact Date)   Breastfeeding No   BMI 24.41 kg/m²    BP: my cuff  132/80                         patient cuff:  the patient did not bring her cuff   Appearance alert and oriented and in no distress. Skin: warm and dry  Eyes: PERRL  Neck: No JVD, or bruits. No adenopathy or thyromegaly  Lungs: Chest is clear; no wheezes or rales. Heart: S1 and S2 normal, no murmurs, clicks, gallops or rubs. Regular rate and rhythm. Ext[de-identified] No edema  Lab review: today. Assessment/Plan:    Radha Veras was seen today for anxiety. Diagnoses and all orders for this visit:    Essential hypertension  Stable/Controlled  Continue current treatment  Home BP checks  Return 3 months      Attention deficit disorder, unspecified hyperactivity presence  -     amphetamine-dextroamphetamine (ADDERALL XR) 30 MG extended release capsule; Take 1 capsule by mouth 2 times daily for 30 days. OARRS report reviewed and no inconsistencies noted   The patient understands the risks of dependency/addiction with adderall and will take as little as possible and discontinue as soon as possible     Anxiety  -     ALPRAZolam (XANAX) 1 MG tablet; TAKE ONE TABLET BY MOUTH THREE TIMES A DAY AS NEEDED FOR ANXIETY  OARRS report reviewed and no inconsistencies noted   The patient understands the risks of dependency/addiction with alprazolam and will take as little as possible and discontinue as soon as possible     Menstrual cramps  -     ibuprofen (ADVIL;MOTRIN) 800 MG tablet; 1 tab prn tid with food    Nausea  -     ondansetron (ZOFRAN) 4 MG tablet;  Take 1 tablet by mouth every 6 hours as needed for Nausea or Vomiting    Cold sore  -     valACYclovir (VALTREX) 1 g tablet; TAKE TWO TABLETS BY MOUTH AT SIGN OF BREAKOUT AND IN 12 HOURS. (4 TABLETS TOTAL FOR COLD SORE)    Allergic conjunctivitis of both eyes  - olopatadine (PATANOL) 0.1 % ophthalmic solution; Place 1 drop into both eyes 2 times daily    Encounter for hepatitis C screening test for low risk patient  -     Hepatitis C Antibody;  Future  Other lab has been ordered since last August.

## 2021-07-30 LAB — HEPATITIS C ANTIBODY INTERPRETATION: NORMAL

## 2021-08-03 ENCOUNTER — TELEPHONE (OUTPATIENT)
Dept: FAMILY MEDICINE CLINIC | Age: 41
End: 2021-08-03

## 2021-08-03 NOTE — TELEPHONE ENCOUNTER
----- Message from Halie Zavaleta sent at 8/2/2021  6:02 PM EDT -----  Subject: Results Request    QUESTIONS  Which lab or imaging result is the patient calling about? Lipid panel,   comprehensive panel, TSH with reflex  Which provider ordered the test? Shamir Monroe   At what location was the test performed? Date the test was performed? 2021-07-29  Additional Information for Provider? Pt called in to go over test results,   and would like a return call  ---------------------------------------------------------------------------  --------------  CALL BACK INFO  What is the best way for the office to contact you? OK to leave message on   voicemail  Preferred Call Back Phone Number?  [phone]

## 2021-09-03 DIAGNOSIS — F98.8 ATTENTION DEFICIT DISORDER, UNSPECIFIED HYPERACTIVITY PRESENCE: ICD-10-CM

## 2021-09-03 RX ORDER — DEXTROAMPHETAMINE SACCHARATE, AMPHETAMINE ASPARTATE MONOHYDRATE, DEXTROAMPHETAMINE SULFATE AND AMPHETAMINE SULFATE 7.5; 7.5; 7.5; 7.5 MG/1; MG/1; MG/1; MG/1
30 CAPSULE, EXTENDED RELEASE ORAL 2 TIMES DAILY
Qty: 60 CAPSULE | Refills: 0 | Status: SHIPPED | OUTPATIENT
Start: 2021-09-03 | End: 2021-10-11 | Stop reason: SDUPTHER

## 2021-09-03 NOTE — TELEPHONE ENCOUNTER
Medication:   Requested Prescriptions     Pending Prescriptions Disp Refills    amphetamine-dextroamphetamine (ADDERALL XR) 30 MG extended release capsule 60 capsule 0     Sig: Take 1 capsule by mouth 2 times daily for 30 days. Last Filled:  07/29/21    Patient Phone Number: [phone] (home) [phone] (work)    Last appt: 7/29/2021   Next appt: Visit date not found    Last OARRS:   RX Monitoring 7/29/2021   Attestation -   Periodic Controlled Substance Monitoring No signs of potential drug abuse or diversion identified.

## 2021-09-03 NOTE — TELEPHONE ENCOUNTER
Medication and Quantity requested: amphetamine-dextroamphetamine (ADDERALL XR) 30 MG extended release capsule         Last Visit  7/29/21    Pharmacy and phone number updated in [facility]:  Yes  [facility]

## 2021-09-23 DIAGNOSIS — F34.1 DYSTHYMIA: ICD-10-CM

## 2021-09-23 RX ORDER — VENLAFAXINE HYDROCHLORIDE 75 MG/1
CAPSULE, EXTENDED RELEASE ORAL
Qty: 60 CAPSULE | Refills: 3 | Status: SHIPPED | OUTPATIENT
Start: 2021-09-23 | End: 2022-01-28

## 2021-09-23 NOTE — TELEPHONE ENCOUNTER
Medication and Quantity requested:  venlafaxine (EFFEXOR XR) 75 MG extended release capsule - qty 60        Last Visit  07/29/21 - Dr Lawrence Hernandez and phone number updated in [facility]:  Yes    Mir No

## 2021-09-23 NOTE — TELEPHONE ENCOUNTER
Medication:   Requested Prescriptions     Pending Prescriptions Disp Refills    venlafaxine (EFFEXOR XR) 75 MG extended release capsule 60 capsule 3     Sig: TAKE TWO CAPSULES BY MOUTH DAILY        Last Filled:      Patient Phone Number: [phone] (home) [phone] (work)    Last appt: 7/29/2021   Next appt: Visit date not found    Last OARRS:   RX Monitoring 7/29/2021   Attestation -   Periodic Controlled Substance Monitoring No signs of potential drug abuse or diversion identified.

## 2021-10-11 DIAGNOSIS — F98.8 ATTENTION DEFICIT DISORDER, UNSPECIFIED HYPERACTIVITY PRESENCE: ICD-10-CM

## 2021-10-11 RX ORDER — DEXTROAMPHETAMINE SACCHARATE, AMPHETAMINE ASPARTATE MONOHYDRATE, DEXTROAMPHETAMINE SULFATE AND AMPHETAMINE SULFATE 7.5; 7.5; 7.5; 7.5 MG/1; MG/1; MG/1; MG/1
30 CAPSULE, EXTENDED RELEASE ORAL 2 TIMES DAILY
Qty: 60 CAPSULE | Refills: 0 | Status: SHIPPED | OUTPATIENT
Start: 2021-10-11 | End: 2021-11-10 | Stop reason: SDUPTHER

## 2021-10-11 NOTE — TELEPHONE ENCOUNTER
Medication:   Requested Prescriptions     Pending Prescriptions Disp Refills    amphetamine-dextroamphetamine (ADDERALL XR) 30 MG extended release capsule 60 capsule 0     Sig: Take 1 capsule by mouth 2 times daily for 30 days. Last Filled:  09/03/21    Patient Phone Number: [phone] (home) [phone] (work)    Last appt: 7/29/2021   Next appt: Visit date not found    Last OARRS:   RX Monitoring 7/29/2021   Attestation -   Periodic Controlled Substance Monitoring No signs of potential drug abuse or diversion identified.

## 2021-10-11 NOTE — TELEPHONE ENCOUNTER
Medication and Quantity requested: amphetamine-dextroamphetamine (ADDERALL XR) 30 MG extended release capsule         Last Visit  7/29/21    Pharmacy and phone number updated in [facility]:  Yes    Vee

## 2021-10-22 DIAGNOSIS — I10 ESSENTIAL HYPERTENSION: ICD-10-CM

## 2021-10-22 RX ORDER — METOPROLOL SUCCINATE 25 MG/1
TABLET, EXTENDED RELEASE ORAL
Qty: 90 TABLET | Refills: 1 | Status: SHIPPED | OUTPATIENT
Start: 2021-10-22 | End: 2022-04-20

## 2021-10-22 NOTE — TELEPHONE ENCOUNTER
Medication:   Requested Prescriptions     Pending Prescriptions Disp Refills    metoprolol succinate (TOPROL XL) 25 MG extended release tablet [Pharmacy Med Name: METOPROLOL SUCC ER 25 MG TAB] 90 tablet 1     Sig: TAKE ONE TABLET BY MOUTH DAILY       Last Filled:  2/2/2021, 90, 1    Patient Phone Number: [phone] (home) [phone] (work)    Last appt: 7/29/2021   Next appt: Visit date not found    Lab Results   Component Value Date     07/29/2021    K 4.5 07/29/2021     07/29/2021    CO2 25 07/29/2021    BUN 25 (H) 07/29/2021    CREATININE 1.4 (H) 07/29/2021    GLUCOSE 86 07/29/2021    CALCIUM 11.2 (H) 07/29/2021    PROT 7.2 07/29/2021    LABALBU 4.7 07/29/2021    BILITOT <0.2 07/29/2021    ALKPHOS 50 07/29/2021    AST 22 07/29/2021    ALT 16 07/29/2021    LABGLOM 41 (A) 07/29/2021    GFRAA 50 (A) 07/29/2021    AGRATIO 1.9 07/29/2021    GLOB 2.5 07/29/2021

## 2021-11-10 DIAGNOSIS — F98.8 ATTENTION DEFICIT DISORDER, UNSPECIFIED HYPERACTIVITY PRESENCE: ICD-10-CM

## 2021-11-10 RX ORDER — DEXTROAMPHETAMINE SACCHARATE, AMPHETAMINE ASPARTATE MONOHYDRATE, DEXTROAMPHETAMINE SULFATE AND AMPHETAMINE SULFATE 7.5; 7.5; 7.5; 7.5 MG/1; MG/1; MG/1; MG/1
30 CAPSULE, EXTENDED RELEASE ORAL 2 TIMES DAILY
Qty: 60 CAPSULE | Refills: 0 | Status: SHIPPED | OUTPATIENT
Start: 2021-11-10 | End: 2021-12-09 | Stop reason: SDUPTHER

## 2021-11-10 NOTE — TELEPHONE ENCOUNTER
Medication and Quantity requested:    amphetamine-dextroamphetamine (ADDERALL XR) 30 MG extended release capsule #60    Last Visit  7/29/21    Pharmacy and phone number updated in [facility]:  Yes, gautam

## 2021-12-09 DIAGNOSIS — F98.8 ATTENTION DEFICIT DISORDER, UNSPECIFIED HYPERACTIVITY PRESENCE: ICD-10-CM

## 2021-12-09 RX ORDER — DEXTROAMPHETAMINE SACCHARATE, AMPHETAMINE ASPARTATE MONOHYDRATE, DEXTROAMPHETAMINE SULFATE AND AMPHETAMINE SULFATE 7.5; 7.5; 7.5; 7.5 MG/1; MG/1; MG/1; MG/1
30 CAPSULE, EXTENDED RELEASE ORAL 2 TIMES DAILY
Qty: 60 CAPSULE | Refills: 0 | Status: SHIPPED | OUTPATIENT
Start: 2021-12-09 | End: 2022-01-14 | Stop reason: SDUPTHER

## 2021-12-09 NOTE — TELEPHONE ENCOUNTER
Medication and Quantity requested:  amphetamine-dextroamphetamine (ADDERALL XR) 30 MG extended release capsule - qty 60        Last Visit  07/29/21 - Dr Gin Mccoy and phone number updated in [facility]:  Yes    Daniella Lund

## 2022-01-14 DIAGNOSIS — F98.8 ATTENTION DEFICIT DISORDER, UNSPECIFIED HYPERACTIVITY PRESENCE: ICD-10-CM

## 2022-01-14 RX ORDER — DEXTROAMPHETAMINE SACCHARATE, AMPHETAMINE ASPARTATE MONOHYDRATE, DEXTROAMPHETAMINE SULFATE AND AMPHETAMINE SULFATE 7.5; 7.5; 7.5; 7.5 MG/1; MG/1; MG/1; MG/1
30 CAPSULE, EXTENDED RELEASE ORAL 2 TIMES DAILY
Qty: 60 CAPSULE | Refills: 0 | Status: SHIPPED | OUTPATIENT
Start: 2022-01-14 | End: 2022-02-17 | Stop reason: SDUPTHER

## 2022-01-14 NOTE — TELEPHONE ENCOUNTER
Medication and Quantity requested: amphetamine-dextroamphetamine (ADDERALL XR) 30 MG extended release capsule       Last Visit  7/29/21    Pharmacy and phone number updated in [facility]:  Yes [facility]

## 2022-01-14 NOTE — TELEPHONE ENCOUNTER
Medication:   Requested Prescriptions     Pending Prescriptions Disp Refills    amphetamine-dextroamphetamine (ADDERALL XR) 30 MG extended release capsule 60 capsule 0     Sig: Take 1 capsule by mouth 2 times daily for 30 days. Last Filled:  12/9/21  Patient Phone Number: [phone] (home) [phone] (work)    Last appt: 7/29/2021   Next appt: 1/25/2022    Last OARRS:   RX Monitoring 7/29/2021   Attestation -   Periodic Controlled Substance Monitoring No signs of potential drug abuse or diversion identified.

## 2022-01-25 ENCOUNTER — VIRTUAL VISIT (OUTPATIENT)
Dept: FAMILY MEDICINE CLINIC | Age: 42
End: 2022-01-25
Payer: COMMERCIAL

## 2022-01-25 DIAGNOSIS — F41.9 ANXIETY: ICD-10-CM

## 2022-01-25 DIAGNOSIS — I10 ESSENTIAL HYPERTENSION: ICD-10-CM

## 2022-01-25 DIAGNOSIS — F98.8 ATTENTION DEFICIT DISORDER, UNSPECIFIED HYPERACTIVITY PRESENCE: Primary | ICD-10-CM

## 2022-01-25 PROCEDURE — 99213 OFFICE O/P EST LOW 20 MIN: CPT | Performed by: FAMILY MEDICINE

## 2022-01-25 RX ORDER — ALPRAZOLAM 1 MG/1
TABLET ORAL
Qty: 90 TABLET | Refills: 2 | Status: SHIPPED | OUTPATIENT
Start: 2022-01-25 | End: 2022-07-26 | Stop reason: SDUPTHER

## 2022-01-25 ASSESSMENT — ENCOUNTER SYMPTOMS
RESPIRATORY NEGATIVE: 1
GASTROINTESTINAL NEGATIVE: 1

## 2022-01-25 NOTE — PROGRESS NOTES
2022    TELEHEALTH EVALUATION -- Audio/Visual (During XYOAR-62 public health emergency)    HPI:    Angle Lugo (:  1980) has requested an audio/video evaluation for the following concern(s):    Hypertension: Patient here for follow-up of elevated blood pressure. Blood pressure is within normal limits  at home. Patient denies chest pain, dyspnea and irregular heart beat. She denies side effects from medication. Anxiety: OARRS check: continues with anxiety and needs medication  ADD: OARRS check: continues with anxiety and needs medication    Review of Systems   Constitutional: Negative. Respiratory: Negative. Cardiovascular: Negative. Gastrointestinal: Negative. Endocrine: Negative. Genitourinary: Negative. Musculoskeletal: Negative. Neurological: Negative. Psychiatric/Behavioral: The patient is nervous/anxious. Prior to Visit Medications    Medication Sig Taking? Authorizing Provider   ALPRAZolam Nataliya Meza) 1 MG tablet TAKE ONE TABLET BY MOUTH THREE TIMES A DAY AS NEEDED FOR ANXIETY Yes Audi Carpenter MD   amphetamine-dextroamphetamine (ADDERALL XR) 30 MG extended release capsule Take 1 capsule by mouth 2 times daily for 30 days.  Yes Audi Carpenter MD   levothyroxine (SYNTHROID) 50 MCG tablet TAKE ONE TABLET BY MOUTH DAILY Yes CHARLOTTE Martinez CNP   hydroCHLOROthiazide (HYDRODIURIL) 12.5 MG tablet TAKE ONE TABLET BY MOUTH DAILY Yes CHARLOTTE Martinez CNP   metoprolol succinate (TOPROL XL) 25 MG extended release tablet TAKE ONE TABLET BY MOUTH DAILY Yes Audi Carpenter MD   venlafaxine (EFFEXOR XR) 75 MG extended release capsule TAKE TWO CAPSULES BY MOUTH DAILY Yes Audi Carpenter MD   ibuprofen (ADVIL;MOTRIN) 800 MG tablet 1 tab prn tid with food Yes Audi Carpenter MD   ondansetron (ZOFRAN) 4 MG tablet Take 1 tablet by mouth every 6 hours as needed for Nausea or Vomiting Yes Audi Carpenter MD   valACYclovir (VALTREX) 1 g tablet TAKE TWO TABLETS BY MOUTH AT SIGN OF BREAKOUT AND IN 12 HOURS. (4 TABLETS TOTAL FOR COLD SORE) Yes Evette Abbott MD   amLODIPine (NORVASC) 5 MG tablet TAKE ONE TABLET BY MOUTH DAILY Yes Evette Abbott MD       Social History     Tobacco Use    Smoking status: Never Smoker    Smokeless tobacco: Never Used   Vaping Use    Vaping Use: Never used   Substance Use Topics    Alcohol use: Yes     Alcohol/week: 7.0 standard drinks     Types: 7 Glasses of wine per week    Drug use: No        Allergies   Allergen Reactions    Citalopram Hydrobromide    ,   Past Medical History:   Diagnosis Date    ADD (attention deficit disorder with hyperactivity)     Anxiety     HPV (human papilloma virus) anogenital infection     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 No pertinent surgical history. ,   Social History     Tobacco Use    Smoking status: Never Smoker    Smokeless tobacco: Never Used   Vaping Use    Vaping Use: Never used   Substance Use Topics    Alcohol use: Yes     Alcohol/week: 7.0 standard drinks     Types: 7 Glasses of wine per week    Drug use: No   , History reviewed. No pertinent family history. ,   Immunization History   Administered Date(s) Administered    COVID-19, J&J, PF, 0.5 mL 08/10/2021    DTaP 1978, 1978, 01/12/1979, 07/11/1981, 08/19/1983    Hepatitis B 07/14/1999, 08/14/1999, 12/14/1999    Influenza Vaccine, unspecified formulation 11/01/2017    Influenza Virus Vaccine 11/03/2014, 10/01/2015, 10/21/2016, 11/01/2017    Influenza Whole 10/01/2015    Influenza, Intradermal, Preservative free 10/21/2013    Influenza, [facility] [place], IM, PF (6 mo and older Fluzone, Flulaval, Fluarix, and 3 yrs and older Afluria) 10/05/2015, 11/08/2018, 10/22/2019    MMR 01/21/1979, 07/09/1990    PPD Test 10/22/2019, 11/19/2019, 12/03/2019    Polio IPV (IPOL) 01/21/1979    Td, unspecified formulation 04/29/1994    Tdap (Boostrix, Adacel) 08/25/2006, 01/05/2007, 12/11/2017   ,   Health Maintenance   Topic Date Due    Flu vaccine (1) 09/01/2021    COVID-19 Vaccine (2 - Booster for Omer series) 10/05/2021    Depression Screen  03/25/2022    Cervical cancer screen  05/16/2022    Potassium monitoring  07/29/2022    Creatinine monitoring  07/29/2022    Lipid screen  07/29/2026    DTaP/Tdap/Td vaccine (9 - Td or Tdap) 12/11/2027    Hepatitis C screen  Completed    HIV screen  Completed    Hepatitis A vaccine  Aged Out    Hepatitis B vaccine  Aged Out    Hib vaccine  Aged Out    Meningococcal (ACWY) vaccine  Aged Out    Pneumococcal 0-64 years Vaccine  Aged Out       PHYSICAL EXAMINATION:  [ INSTRUCTIONS:  \"[x]\" Indicates a positive item  \"[]\" Indicates a negative item  -- DELETE ALL ITEMS NOT EXAMINED]  Vital Signs: (As obtained by patient/caregiver or practitioner observation)  /74  Respiratory ratenormal         Constitutional: [x] Appears well-developed and well-nourished [x] No apparent distress      [] Abnormal-   Mental status  [x] Alert and awake  [x] Oriented to person/place/time [x]Able to follow commands      Eyes:  EOM    [x]  Normal  [] Abnormal-  Sclera  [x]  Normal  [] Abnormal -         Discharge [x]  None visible  [] Abnormal -    HENT:   [x] Normocephalic, atraumatic.   [] Abnormal   [] Mouth/Throat: Mucous membranes are moist.     External Ears [] Normal  [] Abnormal-   Not visualized  Neck: [x] No visualized mass     Pulmonary/Chest: [x] Respiratory effort normal.  [] No visualized signs of difficulty breathing or respiratory distress        [] Abnormal-      Musculoskeletal:   [] Normal gait with no signs of ataxia         [x] Normal range of motion of neck        [] Abnormal-       Neurological:        [x] No Facial Asymmetry (Cranial nerve 7 motor function) (limited exam to video visit)          [x] No gaze palsy        [] Abnormal-         Skin:        [x] No significant exanthematous lesions or discoloration noted on facial skin         [] Abnormal-            Psychiatric:       [x] Normal Affect [x] No Hallucinations        [] Abnormal-     Other pertinent observable physical exam findings-     ASSESSMENT/PLAN:  1. Anxiety  - ALPRAZolam (XANAX) 1 MG tablet; TAKE ONE TABLET BY MOUTH THREE TIMES A DAY AS NEEDED FOR ANXIETY  Dispense: 90 tablet; Refill: 2  OARRS report reviewed and no inconsistencies noted   The patient understands the risks of dependency/addiction with alprzolam and will take as little as possible and discontinue as soon as possible       2. Attention deficit disorder, unspecified hyperactivity presence  OARRS report reviewed and no inconsistencies noted   The patient understands the risks of dependency/addiction with Adderall and will take as little as possible and discontinue as soon as possible       3. Essential hypertension  Stable/Controlled  Continue current treatment  Home BP checks  Return 3 months        Return in about 6 months (around 7/25/2022) for OARRS, htnEdgar Yung is a 35 y.o. female being evaluated by a Virtual Visit (video visit) encounter to address concerns as mentioned above. A caregiver was present when appropriate. Due to this being a TeleHealth encounter (During JVFKW-39 public health emergency), evaluation of the following organ systems was limited: Vitals/Constitutional/EENT/Resp/CV/GI//MS/Neuro/Skin/Heme-Lymph-Imm. Pursuant to the emergency declaration under the [address] [place] [address] and the [employer] and [employer] Act, this Virtual Visit was conducted with patient's (and/or legal guardian's) consent, to reduce the patient's risk of exposure to COVID-19 and provide necessary medical care. The patient (and/or legal guardian) has also been advised to contact this office for worsening conditions or problems, and seek emergency medical treatment and/or call 911 if deemed necessary.      Patient identification was verified at the start of the visit: Yes    Total time spent on this encounter: 20 minutes    Services were provided through a video synchronous discussion virtually to substitute for in-person clinic visit. Patient and provider were located at their individual homes. --Morris Lockwood MD on 1/25/2022 at 3:31 PM    An electronic signature was used to authenticate this note.

## 2022-02-17 DIAGNOSIS — F98.8 ATTENTION DEFICIT DISORDER, UNSPECIFIED HYPERACTIVITY PRESENCE: ICD-10-CM

## 2022-02-17 RX ORDER — DEXTROAMPHETAMINE SACCHARATE, AMPHETAMINE ASPARTATE MONOHYDRATE, DEXTROAMPHETAMINE SULFATE AND AMPHETAMINE SULFATE 7.5; 7.5; 7.5; 7.5 MG/1; MG/1; MG/1; MG/1
30 CAPSULE, EXTENDED RELEASE ORAL 2 TIMES DAILY
Qty: 60 CAPSULE | Refills: 0 | Status: SHIPPED | OUTPATIENT
Start: 2022-02-17 | End: 2022-03-17 | Stop reason: SDUPTHER

## 2022-02-17 NOTE — TELEPHONE ENCOUNTER
Medication and Quantity requested:    amphetamine-dextroamphetamine (ADDERALL XR) 30 MG extended release capsule - qty 60        Last Visit  01/25/22 - Dr Caesar Angel and phone number updated in [facility]:  Yes    [facility] [address] [facility]

## 2022-03-17 DIAGNOSIS — F98.8 ATTENTION DEFICIT DISORDER, UNSPECIFIED HYPERACTIVITY PRESENCE: ICD-10-CM

## 2022-03-17 RX ORDER — DEXTROAMPHETAMINE SACCHARATE, AMPHETAMINE ASPARTATE MONOHYDRATE, DEXTROAMPHETAMINE SULFATE AND AMPHETAMINE SULFATE 7.5; 7.5; 7.5; 7.5 MG/1; MG/1; MG/1; MG/1
30 CAPSULE, EXTENDED RELEASE ORAL 2 TIMES DAILY
Qty: 60 CAPSULE | Refills: 0 | Status: SHIPPED | OUTPATIENT
Start: 2022-03-17 | End: 2022-04-26 | Stop reason: SDUPTHER

## 2022-03-17 NOTE — TELEPHONE ENCOUNTER
Medication:   Requested Prescriptions     Pending Prescriptions Disp Refills    amphetamine-dextroamphetamine (ADDERALL XR) 30 MG extended release capsule 60 capsule 0     Sig: Take 1 capsule by mouth 2 times daily for 30 days. Last Filled: 2/17/21     Patient Phone Number: [phone] (home) [phone] (work)    Last appt: 1/25/2022   Next appt: Visit date not found    Last OARRS:   RX Monitoring 7/29/2021   Attestation -   Periodic Controlled Substance Monitoring No signs of potential drug abuse or diversion identified.

## 2022-03-17 NOTE — TELEPHONE ENCOUNTER
----- Message from Ivis Wick sent at 3/17/2022  9:07 AM EDT -----  Subject: Refill Request    QUESTIONS  Name of Medication? amphetamine-dextroamphetamine (ADDERALL XR) 30 MG   extended release capsule  Patient-reported dosage and instructions? 30 MG XR 1 TABLET 2XDAY  How many days do you have left? 2  Preferred Pharmacy? [address] [place] [address] phone number (if available)? [phone]  ---------------------------------------------------------------------------  --------------  [place] INFO  What is the best way for the office to contact you? OK to leave message on   voicemail  Preferred Call Back Phone Number?  [phone]

## 2022-03-19 DIAGNOSIS — B00.1 COLD SORE: ICD-10-CM

## 2022-03-21 RX ORDER — VALACYCLOVIR HYDROCHLORIDE 1 G/1
TABLET, FILM COATED ORAL
Qty: 20 TABLET | Refills: 3 | Status: SHIPPED | OUTPATIENT
Start: 2022-03-21 | End: 2022-08-26 | Stop reason: SDUPTHER

## 2022-03-21 NOTE — TELEPHONE ENCOUNTER
Medication:   Requested Prescriptions     Pending Prescriptions Disp Refills    valACYclovir (VALTREX) 1 g tablet [Pharmacy Med Name: VALACYCLOVIR HCL 1 GRAM TABLET] 20 tablet 3     Sig: TAKE TWO TABLETS BY MOUTH AT SIGN OF BREAKOUT AND REPEAT IN 12 HOURS (FOUR TABLETS TOTAL FOR COLD SORE)        Last Filled:  7/29/21 with 3 rfefills    Patient Phone Number: [phone] (home) [phone] (work)    Last appt: 1/25/2022   Next appt: Visit date not found    Last OARRS:   RX Monitoring 7/29/2021   Attestation -   Periodic Controlled Substance Monitoring No signs of potential drug abuse or diversion identified.

## 2022-04-20 DIAGNOSIS — I10 ESSENTIAL HYPERTENSION: ICD-10-CM

## 2022-04-20 RX ORDER — METOPROLOL SUCCINATE 25 MG/1
TABLET, EXTENDED RELEASE ORAL
Qty: 90 TABLET | Refills: 3 | Status: SHIPPED | OUTPATIENT
Start: 2022-04-20

## 2022-04-20 NOTE — TELEPHONE ENCOUNTER
lov 1/25/22  lrf 90 1 10/22/21\  Lab Results   Component Value Date    CHOL 186 07/29/2021    CHOL 206 (H) 08/27/2018    CHOL 176 03/30/2017     Lab Results   Component Value Date    TRIG 53 07/29/2021    TRIG 103 08/27/2018    TRIG 58 03/30/2017     Lab Results   Component Value Date     (H) 07/29/2021     (H) 08/27/2018     (H) 03/30/2017     Lab Results   Component Value Date    LDLCALC 61 07/29/2021    LDLCALC 65 08/27/2018    LDLCALC 55 03/30/2017     Lab Results   Component Value Date    LABVLDL 11 07/29/2021    LABVLDL 21 08/27/2018    LABVLDL 12 03/30/2017     No results found for: CHOLHDLRATIO

## 2022-04-21 DIAGNOSIS — I10 ESSENTIAL HYPERTENSION: ICD-10-CM

## 2022-04-21 RX ORDER — AMLODIPINE BESYLATE 5 MG/1
TABLET ORAL
Qty: 90 TABLET | Refills: 2 | Status: SHIPPED | OUTPATIENT
Start: 2022-04-21

## 2022-04-21 NOTE — TELEPHONE ENCOUNTER
lov 1/25/22  lrf 90 1 7/26/21  Lab Results   Component Value Date     07/29/2021    K 4.5 07/29/2021     07/29/2021    CO2 25 07/29/2021    BUN 25 (H) 07/29/2021    CREATININE 1.4 (H) 07/29/2021    GLUCOSE 86 07/29/2021    CALCIUM 11.2 (H) 07/29/2021    PROT 7.2 07/29/2021    LABALBU 4.7 07/29/2021    BILITOT <0.2 07/29/2021    ALKPHOS 50 07/29/2021    AST 22 07/29/2021    ALT 16 07/29/2021    LABGLOM 41 (A) 07/29/2021    GFRAA 50 (A) 07/29/2021    AGRATIO 1.9 07/29/2021    GLOB 2.5 07/29/2021

## 2022-04-26 DIAGNOSIS — F98.8 ATTENTION DEFICIT DISORDER, UNSPECIFIED HYPERACTIVITY PRESENCE: ICD-10-CM

## 2022-04-26 RX ORDER — DEXTROAMPHETAMINE SACCHARATE, AMPHETAMINE ASPARTATE MONOHYDRATE, DEXTROAMPHETAMINE SULFATE AND AMPHETAMINE SULFATE 7.5; 7.5; 7.5; 7.5 MG/1; MG/1; MG/1; MG/1
30 CAPSULE, EXTENDED RELEASE ORAL 2 TIMES DAILY
Qty: 60 CAPSULE | Refills: 0 | Status: SHIPPED | OUTPATIENT
Start: 2022-04-26 | End: 2022-05-26 | Stop reason: SDUPTHER

## 2022-04-26 NOTE — TELEPHONE ENCOUNTER
Medication:   Requested Prescriptions     Pending Prescriptions Disp Refills    amphetamine-dextroamphetamine (ADDERALL XR) 30 MG extended release capsule 60 capsule 0     Sig: Take 1 capsule by mouth 2 times daily for 30 days. Last Filled:  3/17/22    Patient Phone Number: [phone] (home) [phone] (work)    Last appt: 1/25/2022   Next appt: Visit date not found    Last OARRS:   RX Monitoring 7/29/2021   Attestation -   Periodic Controlled Substance Monitoring No signs of potential drug abuse or diversion identified.

## 2022-04-26 NOTE — TELEPHONE ENCOUNTER
Medication and Quantity requested: amphetamine-dextroamphetamine (ADDERALL XR) 30 MG extended release capsule          Last Visit  1/25/2022    Pharmacy and phone number updated in [facility]:  Yes    [address] #[id number]  [address] [place] [address]

## 2022-05-03 DIAGNOSIS — E03.9 ACQUIRED HYPOTHYROIDISM: ICD-10-CM

## 2022-05-03 RX ORDER — LEVOTHYROXINE SODIUM 0.05 MG/1
TABLET ORAL
Qty: 90 TABLET | Refills: 0 | Status: CANCELLED | OUTPATIENT
Start: 2022-05-03

## 2022-05-03 NOTE — TELEPHONE ENCOUNTER
----- Message from [employer] sent at 5/3/2022  8:43 AM EDT -----  Subject: Message to Provider    QUESTIONS  Information for Provider? Needs medication for thyroid. asap   ---------------------------------------------------------------------------  --------------  CALL BACK INFO  What is the best way for the office to contact you? OK to leave message on   voicemail  Preferred Call Back Phone Number? [phone]  ---------------------------------------------------------------------------  --------------  SCRIPT ANSWERS  Relationship to Patient?  Self

## 2022-05-03 NOTE — TELEPHONE ENCOUNTER
Medication:   Requested Prescriptions     Pending Prescriptions Disp Refills    levothyroxine (SYNTHROID) 50 MCG tablet 90 tablet 0     Sig: TAKE ONE TABLET BY MOUTH DAILY       Last Filled:  1/3/22    Patient Phone Number: [phone] (home) [phone] (work)    Last appt: 1/25/2022   Next appt: Visit date not found    Last Thyroid:   Lab Results   Component Value Date    TSH 1.73 10/24/2019    T4FREE 0.8 08/27/2018    L0GOBGB 5.0 10/24/2019

## 2022-05-03 NOTE — TELEPHONE ENCOUNTER
----- Message from Jose Wilson sent at 5/3/2022  8:42 AM EDT -----  Subject: Refill Request    QUESTIONS  Name of Medication? levothyroxine (SYNTHROID) 50 MCG tablet  Patient-reported dosage and instructions? 0  How many days do you have left? 0  Preferred Pharmacy? [facility] [id number]  Pharmacy phone number (if available)? [phone]  Additional Information for Provider? been out of it for a week now.   ---------------------------------------------------------------------------  --------------  CALL BACK INFO  What is the best way for the office to contact you? OK to leave message on   voicemail  Preferred Call Back Phone Number? [phone]  ---------------------------------------------------------------------------  --------------  SCRIPT ANSWERS  Relationship to Patient?  Self

## 2022-05-06 DIAGNOSIS — E03.9 ACQUIRED HYPOTHYROIDISM: ICD-10-CM

## 2022-05-06 RX ORDER — LEVOTHYROXINE SODIUM 0.05 MG/1
TABLET ORAL
Qty: 90 TABLET | Refills: 0 | Status: SHIPPED | OUTPATIENT
Start: 2022-05-06 | End: 2022-07-26 | Stop reason: SDUPTHER

## 2022-05-26 DIAGNOSIS — F98.8 ATTENTION DEFICIT DISORDER, UNSPECIFIED HYPERACTIVITY PRESENCE: ICD-10-CM

## 2022-05-26 RX ORDER — DEXTROAMPHETAMINE SACCHARATE, AMPHETAMINE ASPARTATE MONOHYDRATE, DEXTROAMPHETAMINE SULFATE AND AMPHETAMINE SULFATE 7.5; 7.5; 7.5; 7.5 MG/1; MG/1; MG/1; MG/1
30 CAPSULE, EXTENDED RELEASE ORAL 2 TIMES DAILY
Qty: 60 CAPSULE | Refills: 0 | Status: SHIPPED | OUTPATIENT
Start: 2022-05-26 | End: 2022-06-24 | Stop reason: SDUPTHER

## 2022-05-26 NOTE — TELEPHONE ENCOUNTER
Medication:   Requested Prescriptions     Pending Prescriptions Disp Refills    amphetamine-dextroamphetamine (ADDERALL XR) 30 MG extended release capsule 60 capsule 0     Sig: Take 1 capsule by mouth 2 times daily for 30 days. Last Filled:  4/26/2022, 60, 0    Patient Phone Number: [phone] (home) [phone] (work)    Last appt: 1/25/2022   Next appt: Visit date not found    Last OARRS:   RX Monitoring 7/29/2021   Attestation -   Periodic Controlled Substance Monitoring No signs of potential drug abuse or diversion identified.

## 2022-05-26 NOTE — TELEPHONE ENCOUNTER
Medication and Quantity requested:  amphetamine-dextroamphetamine (ADDERALL XR) 30 MG extended release capsule - qty 60        Last Visit  01/25/22 -  [address] and phone number updated in [facility]:  Yes    Laila Carcamo

## 2022-06-24 DIAGNOSIS — F98.8 ATTENTION DEFICIT DISORDER, UNSPECIFIED HYPERACTIVITY PRESENCE: ICD-10-CM

## 2022-06-24 RX ORDER — DEXTROAMPHETAMINE SACCHARATE, AMPHETAMINE ASPARTATE MONOHYDRATE, DEXTROAMPHETAMINE SULFATE AND AMPHETAMINE SULFATE 7.5; 7.5; 7.5; 7.5 MG/1; MG/1; MG/1; MG/1
30 CAPSULE, EXTENDED RELEASE ORAL 2 TIMES DAILY
Qty: 60 CAPSULE | Refills: 0 | Status: SHIPPED | OUTPATIENT
Start: 2022-06-24 | End: 2022-07-26 | Stop reason: SDUPTHER

## 2022-06-24 NOTE — TELEPHONE ENCOUNTER
Medication:   Requested Prescriptions     Pending Prescriptions Disp Refills    amphetamine-dextroamphetamine (ADDERALL XR) 30 MG extended release capsule 60 capsule 0     Sig: Take 1 capsule by mouth 2 times daily for 30 days. Last Filled:  5/26/22    Patient Phone Number: [phone] (home) [phone] (work)    Last appt: 1/25/2022   Next appt: Visit date not found    Last OARRS:   RX Monitoring 7/29/2021   Attestation -   Periodic Controlled Substance Monitoring No signs of potential drug abuse or diversion identified.

## 2022-07-24 DIAGNOSIS — F34.1 DYSTHYMIA: ICD-10-CM

## 2022-07-25 RX ORDER — VENLAFAXINE HYDROCHLORIDE 75 MG/1
CAPSULE, EXTENDED RELEASE ORAL
Qty: 60 CAPSULE | Refills: 4 | Status: SHIPPED | OUTPATIENT
Start: 2022-07-25

## 2022-07-25 NOTE — TELEPHONE ENCOUNTER
Medication:   Requested Prescriptions     Pending Prescriptions Disp Refills    venlafaxine (EFFEXOR XR) 75 MG extended release capsule [Pharmacy Med Name: VENLAFAXINE HCL ER 75 MG CAP] 60 capsule 5     Sig: TAKE TWO CAPSULES BY MOUTH DAILY        Last Filled:    1/28/2022  Patient Phone Number: [phone] (home) [phone] (work)    Last appt: 1/25/2022   Next appt: Visit date not found    Last OARRS:   RX Monitoring 7/29/2021   Attestation -   Periodic Controlled Substance Monitoring No signs of potential drug abuse or diversion identified.

## 2022-07-26 DIAGNOSIS — E03.9 ACQUIRED HYPOTHYROIDISM: ICD-10-CM

## 2022-07-26 DIAGNOSIS — F41.9 ANXIETY: ICD-10-CM

## 2022-07-26 DIAGNOSIS — F98.8 ATTENTION DEFICIT DISORDER, UNSPECIFIED HYPERACTIVITY PRESENCE: ICD-10-CM

## 2022-07-26 RX ORDER — LEVOTHYROXINE SODIUM 0.05 MG/1
TABLET ORAL
Qty: 90 TABLET | Refills: 0 | Status: SHIPPED | OUTPATIENT
Start: 2022-07-26 | End: 2022-08-05

## 2022-07-26 RX ORDER — ALPRAZOLAM 1 MG/1
TABLET ORAL
Qty: 90 TABLET | Refills: 2 | Status: SHIPPED | OUTPATIENT
Start: 2022-07-26 | End: 2022-11-29

## 2022-07-26 RX ORDER — DEXTROAMPHETAMINE SACCHARATE, AMPHETAMINE ASPARTATE MONOHYDRATE, DEXTROAMPHETAMINE SULFATE AND AMPHETAMINE SULFATE 7.5; 7.5; 7.5; 7.5 MG/1; MG/1; MG/1; MG/1
30 CAPSULE, EXTENDED RELEASE ORAL 2 TIMES DAILY
Qty: 60 CAPSULE | Refills: 0 | Status: SHIPPED | OUTPATIENT
Start: 2022-07-26 | End: 2022-08-26 | Stop reason: SDUPTHER

## 2022-07-26 NOTE — TELEPHONE ENCOUNTER
Medication:   Requested Prescriptions     Pending Prescriptions Disp Refills    ALPRAZolam (XANAX) 1 MG tablet 90 tablet 2     Sig: TAKE ONE TABLET BY MOUTH THREE TIMES A DAY AS NEEDED FOR ANXIETY    levothyroxine (SYNTHROID) 50 MCG tablet 90 tablet 0     Si tab daily    amphetamine-dextroamphetamine (ADDERALL XR) 30 MG extended release capsule 60 capsule 0     Sig: Take 1 capsule by mouth in the morning and 1 capsule before bedtime. Do all this for 30 days.        Last Filled: 2022 (levothyroxine) 2022 (adderall) 2021 (xanax)     Patient Phone Number: [phone] (home) [phone] (work)    Last appt: 2022   Next appt: Visit date not found    Last Thyroid:   Lab Results   Component Value Date/Time    TSH 1.73 10/24/2019 09:27 AM    T4FREE 0.8 2018 09:36 AM    T5NASVD 5.0 10/24/2019 09:27 AM

## 2022-08-05 DIAGNOSIS — E03.9 ACQUIRED HYPOTHYROIDISM: ICD-10-CM

## 2022-08-05 DIAGNOSIS — I10 ESSENTIAL HYPERTENSION: Primary | ICD-10-CM

## 2022-08-05 RX ORDER — LEVOTHYROXINE SODIUM 0.05 MG/1
TABLET ORAL
Qty: 90 TABLET | Refills: 0 | Status: SHIPPED | OUTPATIENT
Start: 2022-08-05 | End: 2022-08-26 | Stop reason: SDUPTHER

## 2022-08-05 NOTE — TELEPHONE ENCOUNTER
Medication:   Requested Prescriptions     Pending Prescriptions Disp Refills    levothyroxine (SYNTHROID) 50 MCG tablet [Pharmacy Med Name: LEVOTHYROXINE 50 MCG TABLET] 90 tablet 0     Sig: TAKE ONE TABLET BY MOUTH DAILY        Last Filled:  90 z 0 RF  7/26/22    Patient Phone Number: [phone] (home) [phone] (work)    Last appt: 1/25/2022   Next appt: Visit date not found    Last OARRS:   RX Monitoring 7/29/2021   Attestation -   Periodic Controlled Substance Monitoring No signs of potential drug abuse or diversion identified.

## 2022-08-08 ENCOUNTER — TELEPHONE (OUTPATIENT)
Dept: FAMILY MEDICINE CLINIC | Age: 42
End: 2022-08-08

## 2022-08-15 ENCOUNTER — TELEMEDICINE (OUTPATIENT)
Dept: FAMILY MEDICINE CLINIC | Age: 42
End: 2022-08-15
Payer: COMMERCIAL

## 2022-08-15 DIAGNOSIS — F41.9 ANXIETY: ICD-10-CM

## 2022-08-15 DIAGNOSIS — I10 ESSENTIAL HYPERTENSION: Primary | ICD-10-CM

## 2022-08-15 DIAGNOSIS — F98.8 ATTENTION DEFICIT DISORDER (ADD) WITHOUT HYPERACTIVITY: ICD-10-CM

## 2022-08-15 DIAGNOSIS — L30.9 DERMATITIS: ICD-10-CM

## 2022-08-15 PROCEDURE — 99213 OFFICE O/P EST LOW 20 MIN: CPT | Performed by: FAMILY MEDICINE

## 2022-08-15 SDOH — ECONOMIC STABILITY: FOOD INSECURITY: WITHIN THE PAST 12 MONTHS, YOU WORRIED THAT YOUR FOOD WOULD RUN OUT BEFORE YOU GOT MONEY TO BUY MORE.: NEVER TRUE

## 2022-08-15 SDOH — ECONOMIC STABILITY: FOOD INSECURITY: WITHIN THE PAST 12 MONTHS, THE FOOD YOU BOUGHT JUST DIDN'T LAST AND YOU DIDN'T HAVE MONEY TO GET MORE.: NEVER TRUE

## 2022-08-15 ASSESSMENT — ENCOUNTER SYMPTOMS
GASTROINTESTINAL NEGATIVE: 1
RESPIRATORY NEGATIVE: 1

## 2022-08-15 ASSESSMENT — PATIENT HEALTH QUESTIONNAIRE - PHQ9
SUM OF ALL RESPONSES TO PHQ QUESTIONS 1-9: 0
SUM OF ALL RESPONSES TO PHQ QUESTIONS 1-9: 0
2. FEELING DOWN, DEPRESSED OR HOPELESS: 0
SUM OF ALL RESPONSES TO PHQ QUESTIONS 1-9: 0
SUM OF ALL RESPONSES TO PHQ QUESTIONS 1-9: 0
1. LITTLE INTEREST OR PLEASURE IN DOING THINGS: 0
SUM OF ALL RESPONSES TO PHQ9 QUESTIONS 1 & 2: 0

## 2022-08-15 ASSESSMENT — SOCIAL DETERMINANTS OF HEALTH (SDOH): HOW HARD IS IT FOR YOU TO PAY FOR THE VERY BASICS LIKE FOOD, HOUSING, MEDICAL CARE, AND HEATING?: NOT HARD AT ALL

## 2022-08-15 NOTE — PROGRESS NOTES
8/15/2022    TELEHEALTH EVALUATION -- Audio/Visual (During ETYIF-86 public health emergency)    HPI:    Josselin Simmons (:  1980) has requested an audio/video evaluation for the following concern(s):    Hypertension: Patient here for follow-up of elevated blood pressure. Blood pressure is within normal limits  at home. Patient denies chest pain, dyspnea and irregular heart beat. She denies side effects from medication. Anxiety: OARRS check: continues with anxiety and needs medication. 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 ADD: OARRS check: continues with anxiety and needs medication  Dermatitis: Patient has 3 or 4 macules on her left arm and 1 macule on her lower leg. She states she thinks is a fungal infection. Review of Systems   Constitutional: Negative. Respiratory: Negative. Cardiovascular: Negative. Gastrointestinal: Negative. Endocrine: Negative. Genitourinary: Negative. Skin:  Positive for rash (Per HPI). Neurological: Negative. Psychiatric/Behavioral:          ADD and anxiety are generally well controlled with medication prescribed     Prior to Visit Medications    Medication Sig Taking? Authorizing Provider   nystatin-triamcinolone [facility] II) 264975-6.0 UNIT/GM-% cream Apply topically 3 times daily Yes Juany Meza MD   levothyroxine (SYNTHROID) 50 MCG tablet TAKE ONE TABLET BY MOUTH DAILY Yes Juany Meza MD   ALPRAZolam James Paci) 1 MG tablet TAKE ONE TABLET BY MOUTH THREE TIMES A DAY AS NEEDED FOR ANXIETY Yes Juany Meza MD   amphetamine-dextroamphetamine (ADDERALL XR) 30 MG extended release capsule Take 1 capsule by mouth in the morning and 1 capsule before bedtime. Do all this for 30 days.  Yes Juany Meza MD   venlafaxine (EFFEXOR XR) 75 MG extended release capsule TAKE TWO CAPSULES BY MOUTH DAILY Yes Juany Meza MD   amLODIPine (NORVASC) 5 MG tablet TAKE ONE TABLET BY MOUTH DAILY Yes Keshawn Montero MD   metoprolol succinate (TOPROL XL) 25 MG extended release tablet TAKE ONE TABLET BY MOUTH DAILY Yes Keshawn Montero MD   valACYclovir (VALTREX) 1 g tablet TAKE TWO TABLETS BY MOUTH AT SIGN OF BREAKOUT AND REPEAT IN 12 HOURS (FOUR TABLETS TOTAL FOR COLD SORE) Yes Keshawn Montero MD   hydroCHLOROthiazide (HYDRODIURIL) 12.5 MG tablet TAKE ONE TABLET BY MOUTH DAILY Yes CHARLOTTE Hall CNP   ibuprofen (ADVIL;MOTRIN) 800 MG tablet 1 tab prn tid with food Yes Keshawn Montero MD   ondansetron (ZOFRAN) 4 MG tablet Take 1 tablet by mouth every 6 hours as needed for Nausea or Vomiting Yes Keshawn Montero MD       Social History     Tobacco Use    Smoking status: Never    Smokeless tobacco: Never   Vaping Use    Vaping Use: Never used   Substance Use Topics    Alcohol use: Yes     Alcohol/week: 7.0 standard drinks     Types: 7 Glasses of wine per week    Drug use: No        Allergies   Allergen Reactions    Citalopram Hydrobromide    ,   Past Medical History:   Diagnosis Date    ADD (attention deficit disorder with hyperactivity)     Anxiety     HPV (human papilloma virus) anogenital infection     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 No pertinent surgical history. ,   Social History     Tobacco Use    Smoking status: Never    Smokeless tobacco: Never   Vaping Use    Vaping Use: Never used   Substance Use Topics    Alcohol use: Yes     Alcohol/week: 7.0 standard drinks     Types: 7 Glasses of wine per week    Drug use: No   , History reviewed. No pertinent family history. ,   Immunization History   Administered Date(s) Administered    COVID-19, J&J, (age 18y+), IM, 0.5 mL 08/10/2021    DTaP 1978, 1978, 01/12/1979, 07/11/1981, 08/19/1983    Hepatitis B 07/14/1999, 08/14/1999, 12/14/1999    Influenza Vaccine, unspecified formulation 11/01/2017    Influenza Virus Vaccine 11/03/2014, 10/01/2015, 10/21/2016, 11/01/2017    Influenza Whole 10/01/2015    Influenza, Intradermal, Preservative free 10/21/2013    Influenza, Quadv, IM, PF (6 mo and older Fluzone, Flulaval, Fluarix, and 3 yrs and older Afluria) 10/05/2015, 11/08/2018, 10/22/2019    MMR 01/21/1979, 07/09/1990    PPD Test 10/22/2019, 11/19/2019, 12/03/2019    Polio IPV (IPOL) 01/21/1979    Td, unspecified formulation 04/29/1994    Tdap (Boostrix, Adacel) 08/25/2006, 01/05/2007, 12/11/2017   ,   Health Maintenance   Topic Date Due    COVID-19 Vaccine (2 - Booster for Omer series) 10/05/2021    Depression Screen  03/25/2022    Cervical cancer screen  05/16/2022    Flu vaccine (1) 09/01/2022    Lipids  07/29/2026    DTaP/Tdap/Td vaccine (9 - Td or Tdap) 12/11/2027    Hepatitis C screen  Completed    HIV screen  Completed    Hepatitis A vaccine  Aged Out    Hepatitis B vaccine  Aged Out    Hib vaccine  Aged Out    Meningococcal (ACWY) vaccine  Aged Out    Pneumococcal 0-64 years Vaccine  Aged Out       PHYSICAL EXAMINATION:  [ INSTRUCTIONS:  \"[x]\" Indicates a positive item  \"[]\" Indicates a negative item  -- DELETE ALL ITEMS NOT EXAMINED]  Vital Signs: (As obtained by patient/caregiver or practitioner observation)    Respiratory rate-normal         Constitutional: [x] Appears well-developed and well-nourished [x] No apparent distress      [] Abnormal-   Mental status  [x] Alert and awake  [x] Oriented to person/place/time [x]Able to follow commands      Eyes:  EOM    [x]  Normal  [] Abnormal-  Sclera  [x]  Normal  [] Abnormal -         Discharge [x]  None visible  [] Abnormal -    HENT:   [x] Normocephalic, atraumatic.   [] Abnormal   [] Mouth/Throat: Mucous membranes are moist.     External Ears [] Normal  [] Abnormal-   Not visualized  Neck: [x] No visualized mass     Pulmonary/Chest: [x] Respiratory effort normal.  [] No visualized signs of difficulty breathing or respiratory distress        [] Abnormal-      Musculoskeletal:   [x] Normal gait with no signs of ataxia         [x] Normal range of motion of neck        [] Abnormal- Neurological:        [x] No Facial Asymmetry (Cranial nerve 7 motor function) (limited exam to video visit)          [x] No gaze palsy        [] Abnormal-         Skin:        [] No significant exanthematous lesions or discoloration noted on facial skin         [x] Abnormal-few erythematous macules with what appeared to be central clearing on her left arm           Psychiatric:       [x] Normal Affect [x] No Hallucinations        [] Abnormal-     Other pertinent observable physical exam findings-     ASSESSMENT/PLAN:  1. Essential hypertension  Patient states this is controlled with home blood pressure checks. Continue current treatment. I did discuss that her next visit in 3 months should be an in office visit. Patient is also due for lab  2. Anxiety  OARRS report reviewed and no inconsistencies noted   The patient understands the risks of dependency/addiction with alprazolam and will take as little as possible and discontinue as soon as possible     3. Attention deficit disorder (ADD) without hyperactivity  OARRS report reviewed and no inconsistencies noted   The patient understands the risks of dependency/addiction with Adderall and will take as little as possible and discontinue as soon as possible     4. Dermatitis  - nystatin-triamcinolone (MYCOLOG II) 369026-5.1 UNIT/GM-% cream; Apply topically 3 times daily  Dispense: 30 g; Refill: 1    Return in about 3 months (around 11/15/2022) for htn, OARRS. Adrian Howard is a 38 y.o. female being evaluated by a Virtual Visit (video visit) encounter to address concerns as mentioned above. A caregiver was present when appropriate. Due to this being a TeleHealth encounter (During [place] [address] public [facility] emergency), evaluation of the following organ systems was limited: Vitals/Constitutional/EENT/Resp/CV/GI//MS/Neuro/Skin/Heme-Lymph-Imm.   Pursuant to the emergency declaration under the [address] [facility]5 waiver authority and the Coronavirus Preparedness and Response Supplemental Appropriations Act, this Virtual Visit was conducted with patient's (and/or legal guardian's) consent, to reduce the patient's risk of exposure to COVID-19 and provide necessary medical care. The patient (and/or legal guardian) has also been advised to contact this office for worsening conditions or problems, and seek emergency medical treatment and/or call 911 if deemed necessary. Patient identification was verified at the start of the visit: Yes    Total time spent on this encounter:  20 minutes    Services were provided through a video synchronous discussion virtually to substitute for in-person clinic visit. Patient and provider were located at their individual homes. --Sravanthi Gaspar MD on 8/15/2022 at 4:57 PM    An electronic signature was used to authenticate this note.

## 2022-08-26 DIAGNOSIS — B00.1 COLD SORE: ICD-10-CM

## 2022-08-26 DIAGNOSIS — F98.8 ATTENTION DEFICIT DISORDER, UNSPECIFIED HYPERACTIVITY PRESENCE: ICD-10-CM

## 2022-08-26 DIAGNOSIS — R11.0 NAUSEA: ICD-10-CM

## 2022-08-26 DIAGNOSIS — E03.9 ACQUIRED HYPOTHYROIDISM: ICD-10-CM

## 2022-08-26 DIAGNOSIS — N94.6 MENSTRUAL CRAMPS: ICD-10-CM

## 2022-08-26 RX ORDER — ONDANSETRON 4 MG/1
4 TABLET, FILM COATED ORAL EVERY 6 HOURS PRN
Qty: 30 TABLET | Refills: 3 | Status: SHIPPED | OUTPATIENT
Start: 2022-08-26

## 2022-08-26 RX ORDER — VALACYCLOVIR HYDROCHLORIDE 1 G/1
TABLET, FILM COATED ORAL
Qty: 20 TABLET | Refills: 3 | Status: SHIPPED | OUTPATIENT
Start: 2022-08-26

## 2022-08-26 RX ORDER — IBUPROFEN 800 MG/1
TABLET ORAL
Qty: 100 TABLET | Refills: 5 | Status: SHIPPED | OUTPATIENT
Start: 2022-08-26

## 2022-08-26 RX ORDER — LEVOTHYROXINE SODIUM 0.05 MG/1
TABLET ORAL
Qty: 90 TABLET | Refills: 0 | Status: SHIPPED | OUTPATIENT
Start: 2022-08-26

## 2022-08-26 RX ORDER — DEXTROAMPHETAMINE SACCHARATE, AMPHETAMINE ASPARTATE MONOHYDRATE, DEXTROAMPHETAMINE SULFATE AND AMPHETAMINE SULFATE 7.5; 7.5; 7.5; 7.5 MG/1; MG/1; MG/1; MG/1
30 CAPSULE, EXTENDED RELEASE ORAL 2 TIMES DAILY
Qty: 60 CAPSULE | Refills: 0 | Status: SHIPPED | OUTPATIENT
Start: 2022-08-26 | End: 2022-09-30 | Stop reason: SDUPTHER

## 2022-08-26 NOTE — TELEPHONE ENCOUNTER
Medication:   Requested Prescriptions     Pending Prescriptions Disp Refills    ibuprofen (ADVIL;MOTRIN) 800 MG tablet 100 tablet 5     Si tab prn tid with food    ondansetron (ZOFRAN) 4 MG tablet 30 tablet 3     Sig: Take 1 tablet by mouth every 6 hours as needed for Nausea or Vomiting    valACYclovir (VALTREX) 1 g tablet 20 tablet 3     Sig: TAKE TWO TABLETS BY MOUTH AT SIGN OF BREAKOUT AND REPEAT IN 12 HOURS (FOUR TABLETS TOTAL FOR COLD SORE)    amphetamine-dextroamphetamine (ADDERALL XR) 30 MG extended release capsule 60 capsule 0     Sig: Take 1 capsule by mouth 2 times daily for 30 days.     levothyroxine (SYNTHROID) 50 MCG tablet 90 tablet 0     Sig: TAKE ONE TABLET BY MOUTH DAILY       Last Filled:  2021,3/21/2022    Patient Phone Number: [phone] (home) [phone] (work)    Last appt: 8/15/2022   Next appt: Visit date not found    Last Thyroid:   Lab Results   Component Value Date/Time    TSH 1.73 10/24/2019 09:27 AM    T4FREE 0.8 2018 09:36 AM    Y7TTBEB 5.0 10/24/2019 09:27 AM

## 2022-09-30 DIAGNOSIS — I10 ESSENTIAL HYPERTENSION: ICD-10-CM

## 2022-09-30 DIAGNOSIS — F98.8 ATTENTION DEFICIT DISORDER, UNSPECIFIED HYPERACTIVITY PRESENCE: ICD-10-CM

## 2022-09-30 RX ORDER — DEXTROAMPHETAMINE SACCHARATE, AMPHETAMINE ASPARTATE MONOHYDRATE, DEXTROAMPHETAMINE SULFATE AND AMPHETAMINE SULFATE 7.5; 7.5; 7.5; 7.5 MG/1; MG/1; MG/1; MG/1
30 CAPSULE, EXTENDED RELEASE ORAL 2 TIMES DAILY
Qty: 60 CAPSULE | Refills: 0 | Status: SHIPPED | OUTPATIENT
Start: 2022-09-30 | End: 2022-10-31 | Stop reason: SDUPTHER

## 2022-09-30 RX ORDER — HYDROCHLOROTHIAZIDE 12.5 MG/1
TABLET ORAL
Qty: 90 TABLET | Refills: 1 | Status: SHIPPED | OUTPATIENT
Start: 2022-09-30

## 2022-09-30 NOTE — TELEPHONE ENCOUNTER
Medication:   Requested Prescriptions     Pending Prescriptions Disp Refills    hydroCHLOROthiazide (HYDRODIURIL) 12.5 MG tablet 90 tablet 0    amphetamine-dextroamphetamine (ADDERALL XR) 30 MG extended release capsule 60 capsule 0     Sig: Take 1 capsule by mouth 2 times daily for 30 days.        Last Filled:  1/3/2022, 90, 0  8/26/2022, 60, 0    Patient Phone Number: [phone] (home) [phone] (work)    Last appt: 8/15/2022   Next appt: Visit date not found    Lab Results   Component Value Date     07/29/2021    K 4.5 07/29/2021     07/29/2021    CO2 25 07/29/2021    BUN 25 (H) 07/29/2021    CREATININE 1.4 (H) 07/29/2021    GLUCOSE 86 07/29/2021    CALCIUM 11.2 (H) 07/29/2021    PROT 7.2 07/29/2021    LABALBU 4.7 07/29/2021    BILITOT <0.2 07/29/2021    ALKPHOS 50 07/29/2021    AST 22 07/29/2021    ALT 16 07/29/2021    LABGLOM 41 (A) 07/29/2021    GFRAA 50 (A) 07/29/2021    AGRATIO 1.9 07/29/2021    GLOB 2.5 07/29/2021

## 2022-10-10 RX ORDER — FLUCONAZOLE 150 MG/1
150 TABLET ORAL ONCE
Qty: 2 TABLET | Refills: 0 | Status: SHIPPED | OUTPATIENT
Start: 2022-10-10 | End: 2022-10-10

## 2022-10-31 DIAGNOSIS — F98.8 ATTENTION DEFICIT DISORDER, UNSPECIFIED HYPERACTIVITY PRESENCE: ICD-10-CM

## 2022-10-31 RX ORDER — DEXTROAMPHETAMINE SACCHARATE, AMPHETAMINE ASPARTATE MONOHYDRATE, DEXTROAMPHETAMINE SULFATE AND AMPHETAMINE SULFATE 7.5; 7.5; 7.5; 7.5 MG/1; MG/1; MG/1; MG/1
30 CAPSULE, EXTENDED RELEASE ORAL 2 TIMES DAILY
Qty: 60 CAPSULE | Refills: 0 | Status: SHIPPED | OUTPATIENT
Start: 2022-10-31 | End: 2022-11-30

## 2022-10-31 NOTE — TELEPHONE ENCOUNTER
Medication:   Requested Prescriptions     Pending Prescriptions Disp Refills    amphetamine-dextroamphetamine (ADDERALL XR) 30 MG extended release capsule 60 capsule 0     Sig: Take 1 capsule by mouth 2 times daily for 30 days. Last Filled:  9/30/2022    Patient Phone Number: [phone] (home) [phone] (work)    Last appt: 8/15/2022   Next appt: Visit date not found    Last OARRS:   RX Monitoring 8/15/2022   Attestation -   Periodic Controlled Substance Monitoring No signs of potential drug abuse or diversion identified.

## 2022-11-08 ENCOUNTER — PATIENT MESSAGE (OUTPATIENT)
Dept: FAMILY MEDICINE CLINIC | Age: 42
End: 2022-11-08

## 2022-11-08 RX ORDER — FLUCONAZOLE 150 MG/1
150 TABLET ORAL ONCE
Qty: 2 TABLET | Refills: 0 | Status: CANCELLED | OUTPATIENT
Start: 2022-11-08 | End: 2022-11-08

## 2022-11-08 RX ORDER — FLUCONAZOLE 150 MG/1
150 TABLET ORAL ONCE
Qty: 2 TABLET | Refills: 0 | Status: SHIPPED | OUTPATIENT
Start: 2022-11-08 | End: 2022-11-08

## 2022-11-08 NOTE — TELEPHONE ENCOUNTER
From: Anthony Gama  To: Dr. Mariano Dubin: 11/8/2022 5:05 PM EST  Subject: Berta Jt [facility] [place]  can you please call in refill for Diflucan for   Urinary Tract Infection.   Thank you, Sarah Ojeda
No

## 2022-11-08 NOTE — TELEPHONE ENCOUNTER
Medication and Quantity requested:   fluconazole (DIFLUCAN) 150 MG tablet [[id number]]      Last Visit: 08/15/2022        Pharmacy and phone number updated in EPIC:  [facility]

## 2022-11-08 NOTE — TELEPHONE ENCOUNTER
Medication:   Requested Prescriptions     Pending Prescriptions Disp Refills    fluconazole (DIFLUCAN) 150 MG tablet 2 tablet 0     Sig: Take 1 tablet by mouth once for 1 dose May repeat in 7 days, if symptoms persist or recur. Last Filled:  2 x 0 RF 10/10/22    Patient Phone Number: [phone] (home) [phone] (work)    Last appt: 8/15/2022   Next appt: Visit date not found    Last OARRS:   RX Monitoring 8/15/2022   Attestation -   Periodic Controlled Substance Monitoring No signs of potential drug abuse or diversion identified.

## 2022-11-09 RX ORDER — NITROFURANTOIN 25; 75 MG/1; MG/1
100 CAPSULE ORAL 2 TIMES DAILY
Qty: 14 CAPSULE | Refills: 0 | Status: SHIPPED | OUTPATIENT
Start: 2022-11-09 | End: 2022-11-16

## 2022-12-06 DIAGNOSIS — F98.8 ATTENTION DEFICIT DISORDER, UNSPECIFIED HYPERACTIVITY PRESENCE: ICD-10-CM

## 2022-12-06 RX ORDER — DEXTROAMPHETAMINE SACCHARATE, AMPHETAMINE ASPARTATE MONOHYDRATE, DEXTROAMPHETAMINE SULFATE AND AMPHETAMINE SULFATE 7.5; 7.5; 7.5; 7.5 MG/1; MG/1; MG/1; MG/1
30 CAPSULE, EXTENDED RELEASE ORAL 2 TIMES DAILY
Qty: 60 CAPSULE | Refills: 0 | Status: SHIPPED | OUTPATIENT
Start: 2022-12-06 | End: 2023-01-05

## 2022-12-06 NOTE — TELEPHONE ENCOUNTER
Patient is requesting refill for      amphetamine-dextroamphetamine (ADDERALL XR) 30 MG extended release capsule     Last visit: 8.15.22    Ph[facility]      Please advise.

## 2022-12-06 NOTE — TELEPHONE ENCOUNTER
Medication:   Requested Prescriptions     Pending Prescriptions Disp Refills    amphetamine-dextroamphetamine (ADDERALL XR) 30 MG extended release capsule 60 capsule 0     Sig: Take 1 capsule by mouth 2 times daily for 30 days. Last Filled:   60 x 0 RF 10/31/22    Patient Phone Number: [phone] (home) [phone] (work)    Last appt: 8/15/2022   Next appt: Visit date not found    Last OARRS:   RX Monitoring 8/15/2022   Attestation -   Periodic Controlled Substance Monitoring No signs of potential drug abuse or diversion identified.

## 2023-01-01 DIAGNOSIS — F34.1 DYSTHYMIA: ICD-10-CM

## 2023-01-03 RX ORDER — VENLAFAXINE HYDROCHLORIDE 75 MG/1
CAPSULE, EXTENDED RELEASE ORAL
Qty: 60 CAPSULE | Refills: 3 | Status: SHIPPED | OUTPATIENT
Start: 2023-01-03

## 2023-01-10 DIAGNOSIS — F98.8 ATTENTION DEFICIT DISORDER, UNSPECIFIED HYPERACTIVITY PRESENCE: ICD-10-CM

## 2023-01-10 RX ORDER — DEXTROAMPHETAMINE SACCHARATE, AMPHETAMINE ASPARTATE MONOHYDRATE, DEXTROAMPHETAMINE SULFATE AND AMPHETAMINE SULFATE 7.5; 7.5; 7.5; 7.5 MG/1; MG/1; MG/1; MG/1
30 CAPSULE, EXTENDED RELEASE ORAL 2 TIMES DAILY
Qty: 60 CAPSULE | Refills: 0 | Status: SHIPPED | OUTPATIENT
Start: 2023-01-10 | End: 2023-02-09

## 2023-01-10 NOTE — TELEPHONE ENCOUNTER
Lov 8/15/22  Lrf 60 0 12/6/22 Medication:   Requested Prescriptions     Pending Prescriptions Disp Refills    amphetamine-dextroamphetamine (ADDERALL XR) 30 MG extended release capsule 60 capsule 0     Sig: Take 1 capsule by mouth 2 times daily for 30 days. Last Filled:      Patient Phone Number: [phone] (home) [phone] (work)    Last appt: 8/15/2022   Next appt: Visit date not found    Last OARRS:   RX Monitoring 8/15/2022   Attestation -   Periodic Controlled Substance Monitoring No signs of potential drug abuse or diversion identified.

## 2023-01-10 NOTE — TELEPHONE ENCOUNTER
Medication and Quantity requested:   amphetamine-dextroamphetamine (ADDERALL XR) 30 MG extended release capsule   QTY: 60 capsule        Last Visit: 08/15/2022          Pharmacy and phone number updated in [facility]:  [address] [place]

## 2023-01-21 DIAGNOSIS — I10 ESSENTIAL HYPERTENSION: ICD-10-CM

## 2023-01-23 RX ORDER — AMLODIPINE BESYLATE 5 MG/1
TABLET ORAL
Qty: 90 TABLET | Refills: 1 | Status: SHIPPED | OUTPATIENT
Start: 2023-01-23

## 2023-01-23 NOTE — TELEPHONE ENCOUNTER
Lov8 /15/22  Lrf   90 2 4/21/22  Lab Results   Component Value Date     07/29/2021    K 4.5 07/29/2021     07/29/2021    CO2 25 07/29/2021    BUN 25 (H) 07/29/2021    CREATININE 1.4 (H) 07/29/2021    GLUCOSE 86 07/29/2021    CALCIUM 11.2 (H) 07/29/2021    PROT 7.2 07/29/2021    LABALBU 4.7 07/29/2021    BILITOT <0.2 07/29/2021    ALKPHOS 50 07/29/2021    AST 22 07/29/2021    ALT 16 07/29/2021    LABGLOM 41 (A) 07/29/2021    GFRAA 50 (A) 07/29/2021    AGRATIO 1.9 07/29/2021    GLOB 2.5 07/29/2021

## 2023-02-13 DIAGNOSIS — F98.8 ATTENTION DEFICIT DISORDER, UNSPECIFIED HYPERACTIVITY PRESENCE: ICD-10-CM

## 2023-02-13 RX ORDER — DEXTROAMPHETAMINE SACCHARATE, AMPHETAMINE ASPARTATE MONOHYDRATE, DEXTROAMPHETAMINE SULFATE AND AMPHETAMINE SULFATE 7.5; 7.5; 7.5; 7.5 MG/1; MG/1; MG/1; MG/1
30 CAPSULE, EXTENDED RELEASE ORAL 2 TIMES DAILY
Qty: 60 CAPSULE | Refills: 0 | Status: SHIPPED | OUTPATIENT
Start: 2023-02-13 | End: 2023-03-15

## 2023-02-13 NOTE — TELEPHONE ENCOUNTER
Medication:   Requested Prescriptions     Pending Prescriptions Disp Refills    amphetamine-dextroamphetamine (ADDERALL XR) 30 MG extended release capsule 60 capsule 0     Sig: Take 1 capsule by mouth 2 times daily for 30 days. Last Filled:  1/10/23    Patient Phone Number: [phone] (home) [phone] (work)    Last appt: 8/15/2022   Next appt: Visit date not found    Last OARRS:   RX Monitoring 8/15/2022   Attestation -   Periodic Controlled Substance Monitoring No signs of potential drug abuse or diversion identified.

## 2023-03-13 DIAGNOSIS — F98.8 ATTENTION DEFICIT DISORDER, UNSPECIFIED HYPERACTIVITY PRESENCE: ICD-10-CM

## 2023-03-13 RX ORDER — DEXTROAMPHETAMINE SACCHARATE, AMPHETAMINE ASPARTATE MONOHYDRATE, DEXTROAMPHETAMINE SULFATE AND AMPHETAMINE SULFATE 7.5; 7.5; 7.5; 7.5 MG/1; MG/1; MG/1; MG/1
30 CAPSULE, EXTENDED RELEASE ORAL 2 TIMES DAILY
Qty: 60 CAPSULE | Refills: 0 | Status: SHIPPED | OUTPATIENT
Start: 2023-03-13 | End: 2023-04-12

## 2023-03-13 NOTE — TELEPHONE ENCOUNTER
Medication and Quantity requested:      amphetamine-dextroamphetamine (ADDERALL XR) 30 MG extended release capsule [[id number]]       Last Visit    08/15/2022    Pharmacy and phone number updated in EPIC:  yes    [address]

## 2023-03-13 NOTE — TELEPHONE ENCOUNTER
Medication:   Requested Prescriptions     Pending Prescriptions Disp Refills    amphetamine-dextroamphetamine (ADDERALL XR) 30 MG extended release capsule 60 capsule 0     Sig: Take 1 capsule by mouth 2 times daily for 30 days. Last Filled:  2/13/2023, 60, 0    Patient Phone Number: [phone] (home) [phone] (work)    Last appt: 8/15/2022   Next appt: Visit date not found    Last OARRS:   RX Monitoring 8/15/2022   Attestation -   Periodic Controlled Substance Monitoring No signs of potential drug abuse or diversion identified.

## 2023-04-01 DIAGNOSIS — I10 ESSENTIAL HYPERTENSION: ICD-10-CM

## 2023-04-04 RX ORDER — HYDROCHLOROTHIAZIDE 12.5 MG/1
TABLET ORAL
Qty: 90 TABLET | Refills: 0 | Status: SHIPPED | OUTPATIENT
Start: 2023-04-04

## 2023-04-04 NOTE — TELEPHONE ENCOUNTER
Medication:   Requested Prescriptions     Pending Prescriptions Disp Refills    hydroCHLOROthiazide (HYDRODIURIL) 12.5 MG tablet [Pharmacy Med Name: hydroCHLOROthiazide 12.5 MG TABLET] 90 tablet 1     Sig: TAKE ONE TABLET BY MOUTH DAILY       Last Filled:  9/30/22 with 1 refill    Patient Phone Number: [phone] (home) [phone] (work)    Last appt: 8/15/2022   Next appt: 4/6/2023    Lab Results   Component Value Date     07/29/2021    K 4.5 07/29/2021     07/29/2021    CO2 25 07/29/2021    BUN 25 (H) 07/29/2021    CREATININE 1.4 (H) 07/29/2021    GLUCOSE 86 07/29/2021    CALCIUM 11.2 (H) 07/29/2021    PROT 7.2 07/29/2021    LABALBU 4.7 07/29/2021    BILITOT <0.2 07/29/2021    ALKPHOS 50 07/29/2021    AST 22 07/29/2021    ALT 16 07/29/2021    LABGLOM 41 (A) 07/29/2021    GFRAA 50 (A) 07/29/2021    AGRATIO 1.9 07/29/2021    GLOB 2.5 07/29/2021

## 2023-04-06 ENCOUNTER — OFFICE VISIT (OUTPATIENT)
Dept: FAMILY MEDICINE CLINIC | Age: 43
End: 2023-04-06
Payer: COMMERCIAL

## 2023-04-06 VITALS
HEIGHT: 61 IN | WEIGHT: 129 LBS | DIASTOLIC BLOOD PRESSURE: 76 MMHG | BODY MASS INDEX: 24.35 KG/M2 | SYSTOLIC BLOOD PRESSURE: 118 MMHG

## 2023-04-06 DIAGNOSIS — L30.9 DERMATITIS: ICD-10-CM

## 2023-04-06 DIAGNOSIS — F41.9 ANXIETY: ICD-10-CM

## 2023-04-06 DIAGNOSIS — F98.8 ATTENTION DEFICIT DISORDER, UNSPECIFIED HYPERACTIVITY PRESENCE: ICD-10-CM

## 2023-04-06 DIAGNOSIS — I10 ESSENTIAL HYPERTENSION: Primary | ICD-10-CM

## 2023-04-06 DIAGNOSIS — E03.9 ACQUIRED HYPOTHYROIDISM: ICD-10-CM

## 2023-04-06 PROCEDURE — 99213 OFFICE O/P EST LOW 20 MIN: CPT | Performed by: FAMILY MEDICINE

## 2023-04-06 PROCEDURE — 3078F DIAST BP <80 MM HG: CPT | Performed by: FAMILY MEDICINE

## 2023-04-06 PROCEDURE — 3074F SYST BP LT 130 MM HG: CPT | Performed by: FAMILY MEDICINE

## 2023-04-06 RX ORDER — ALPRAZOLAM 1 MG/1
TABLET ORAL
Qty: 90 TABLET | Refills: 0 | Status: SHIPPED | OUTPATIENT
Start: 2023-04-06 | End: 2023-08-10

## 2023-04-06 SDOH — ECONOMIC STABILITY: HOUSING INSECURITY
IN THE LAST 12 MONTHS, WAS THERE A TIME WHEN YOU DID NOT HAVE A STEADY PLACE TO SLEEP OR SLEPT IN A SHELTER (INCLUDING NOW)?: PATIENT REFUSED

## 2023-04-06 SDOH — ECONOMIC STABILITY: FOOD INSECURITY: WITHIN THE PAST 12 MONTHS, THE FOOD YOU BOUGHT JUST DIDN'T LAST AND YOU DIDN'T HAVE MONEY TO GET MORE.: PATIENT DECLINED

## 2023-04-06 SDOH — ECONOMIC STABILITY: INCOME INSECURITY: HOW HARD IS IT FOR YOU TO PAY FOR THE VERY BASICS LIKE FOOD, HOUSING, MEDICAL CARE, AND HEATING?: PATIENT DECLINED

## 2023-04-06 SDOH — ECONOMIC STABILITY: FOOD INSECURITY: WITHIN THE PAST 12 MONTHS, YOU WORRIED THAT YOUR FOOD WOULD RUN OUT BEFORE YOU GOT MONEY TO BUY MORE.: PATIENT DECLINED

## 2023-04-06 ASSESSMENT — PATIENT HEALTH QUESTIONNAIRE - PHQ9
1. LITTLE INTEREST OR PLEASURE IN DOING THINGS: 0
SUM OF ALL RESPONSES TO PHQ QUESTIONS 1-9: 0
2. FEELING DOWN, DEPRESSED OR HOPELESS: 0
SUM OF ALL RESPONSES TO PHQ QUESTIONS 1-9: 0
SUM OF ALL RESPONSES TO PHQ9 QUESTIONS 1 & 2: 0
SUM OF ALL RESPONSES TO PHQ QUESTIONS 1-9: 0
SUM OF ALL RESPONSES TO PHQ QUESTIONS 1-9: 0

## 2023-04-06 NOTE — PROGRESS NOTES
Subjective:   Devendra Esparza is a 38 y.o. female with hypertension. Hypertension ROS: taking medications as instructed, no medication side effects noted, no TIA's, no chest pain at rest or on exertion, no SOB or dyspnea on exertion, no swelling of ankles or feet. Exercise:weights and cardio three times a week  New concerns: no new issues. Home BP: within normal limits     ADD/OARRS: Patient remains on Adderall. She states this works well. Anxiety/OARRS: Patient also remains on alprazolam although states she usually only takes this at night and oftentimes does not take it. Dermatitis: both legs and is better but never goes away    Outpatient Medications Marked as Taking for the 4/6/23 encounter (Office Visit) with Denisse Sterling MD   Medication Sig Dispense Refill    hydroCHLOROthiazide (HYDRODIURIL) 12.5 MG tablet TAKE ONE TABLET BY MOUTH DAILY 90 tablet 0    amphetamine-dextroamphetamine (ADDERALL XR) 30 MG extended release capsule Take 1 capsule by mouth 2 times daily for 30 days.  60 capsule 0    amLODIPine (NORVASC) 5 MG tablet TAKE ONE TABLET BY MOUTH DAILY 90 tablet 1    venlafaxine (EFFEXOR XR) 75 MG extended release capsule TAKE TWO CAPSULES BY MOUTH DAILY 60 capsule 3    ibuprofen (ADVIL;MOTRIN) 800 MG tablet 1 tab prn tid with food 100 tablet 5    ondansetron (ZOFRAN) 4 MG tablet Take 1 tablet by mouth every 6 hours as needed for Nausea or Vomiting 30 tablet 3    valACYclovir (VALTREX) 1 g tablet TAKE TWO TABLETS BY MOUTH AT SIGN OF BREAKOUT AND REPEAT IN 12 HOURS (FOUR TABLETS TOTAL FOR COLD SORE) 20 tablet 3    levothyroxine (SYNTHROID) 50 MCG tablet TAKE ONE TABLET BY MOUTH DAILY 90 tablet 0    nystatin-triamcinolone (MYCOLOG II) 758447-3.1 UNIT/GM-% cream Apply topically 3 times daily 30 g 1    metoprolol succinate (TOPROL XL) 25 MG extended release tablet TAKE ONE TABLET BY MOUTH DAILY 90 tablet 3     Allergies   Allergen Reactions    Citalopram Hydrobromide        Objective:   /76

## 2023-04-18 DIAGNOSIS — F98.8 ATTENTION DEFICIT DISORDER, UNSPECIFIED HYPERACTIVITY PRESENCE: ICD-10-CM

## 2023-04-19 RX ORDER — DEXTROAMPHETAMINE SACCHARATE, AMPHETAMINE ASPARTATE MONOHYDRATE, DEXTROAMPHETAMINE SULFATE AND AMPHETAMINE SULFATE 7.5; 7.5; 7.5; 7.5 MG/1; MG/1; MG/1; MG/1
30 CAPSULE, EXTENDED RELEASE ORAL 2 TIMES DAILY
Qty: 60 CAPSULE | Refills: 0 | Status: SHIPPED | OUTPATIENT
Start: 2023-04-19 | End: 2023-05-19

## 2023-04-19 NOTE — TELEPHONE ENCOUNTER
Lov 4/6/23  Lrf  60 0 3/13/23 Medication:   Requested Prescriptions     Pending Prescriptions Disp Refills    amphetamine-dextroamphetamine (ADDERALL XR) 30 MG extended release capsule 60 capsule 0     Sig: Take 1 capsule by mouth 2 times daily for 30 days. Last Filled:      Patient Phone Number: [phone] (home) [phone] (work)    Last appt: 4/6/2023   Next appt: Visit date not found    Last OARRS:   RX Monitoring 8/15/2022   Attestation -   Periodic Controlled Substance Monitoring No signs of potential drug abuse or diversion identified.

## 2023-04-21 ENCOUNTER — PATIENT MESSAGE (OUTPATIENT)
Dept: FAMILY MEDICINE CLINIC | Age: 43
End: 2023-04-21

## 2023-04-21 DIAGNOSIS — F98.8 ATTENTION DEFICIT DISORDER, UNSPECIFIED HYPERACTIVITY PRESENCE: Primary | ICD-10-CM

## 2023-04-21 RX ORDER — LISDEXAMFETAMINE DIMESYLATE 60 MG/1
CAPSULE ORAL
Qty: 30 CAPSULE | Refills: 0 | Status: SHIPPED | OUTPATIENT
Start: 2023-04-21 | End: 2023-05-20

## 2023-04-21 NOTE — TELEPHONE ENCOUNTER
From: Leni Reed  To: Dr. Avendano Abler: 4/21/2023 11:40 AM EDT  Subject: Adderall on back order    Tatum Burton,  My adderall is on back order and they are not sure how long it will be.   Can you please call in Vyvanse as this is similiar  Thank you,  Rosy Fish

## 2023-04-23 DIAGNOSIS — E03.9 ACQUIRED HYPOTHYROIDISM: ICD-10-CM

## 2023-04-23 DIAGNOSIS — I10 ESSENTIAL HYPERTENSION: ICD-10-CM

## 2023-04-24 RX ORDER — LEVOTHYROXINE SODIUM 0.05 MG/1
TABLET ORAL
Qty: 90 TABLET | Refills: 0 | Status: SHIPPED | OUTPATIENT
Start: 2023-04-24

## 2023-04-24 RX ORDER — METOPROLOL SUCCINATE 25 MG/1
TABLET, EXTENDED RELEASE ORAL
Qty: 90 TABLET | Refills: 3 | Status: SHIPPED | OUTPATIENT
Start: 2023-04-24

## 2023-04-24 NOTE — TELEPHONE ENCOUNTER
Medication:   Requested Prescriptions     Pending Prescriptions Disp Refills    levothyroxine (SYNTHROID) 50 MCG tablet [Pharmacy Med Name: LEVOTHYROXINE 50 MCG TABLET] 90 tablet 0     Sig: TAKE ONE TABLET BY MOUTH DAILY    metoprolol succinate (TOPROL XL) 25 MG extended release tablet [Pharmacy Med Name: METOPROLOL SUCC ER 25 MG TAB] 90 tablet 3     Sig: TAKE ONE TABLET BY MOUTH DAILY       Last Filled:  8/26/2022, 90, 0  4/20/2022, 90, 32    Patient Phone Number: [phone] (home) [phone] (work)    Last appt: 4/6/2023   Next appt: Visit date not found    Lab Results   Component Value Date     07/29/2021    K 4.5 07/29/2021     07/29/2021    CO2 25 07/29/2021    BUN 25 (H) 07/29/2021    CREATININE 1.4 (H) 07/29/2021    GLUCOSE 86 07/29/2021    CALCIUM 11.2 (H) 07/29/2021    PROT 7.2 07/29/2021    LABALBU 4.7 07/29/2021    BILITOT <0.2 07/29/2021    ALKPHOS 50 07/29/2021    AST 22 07/29/2021    ALT 16 07/29/2021    LABGLOM 41 (A) 07/29/2021    GFRAA 50 (A) 07/29/2021    AGRATIO 1.9 07/29/2021    GLOB 2.5 07/29/2021         Last Thyroid:   Lab Results   Component Value Date/Time    TSH 1.73 10/24/2019 09:27 AM    T4FREE 0.8 08/27/2018 09:36 AM    J7IOSGS 5.0 10/24/2019 09:27 AM

## 2023-05-01 DIAGNOSIS — F98.8 ATTENTION DEFICIT DISORDER, UNSPECIFIED HYPERACTIVITY PRESENCE: ICD-10-CM

## 2023-05-01 NOTE — TELEPHONE ENCOUNTER
Medication and Quantity requested: Lisdexamfetamine Dimesylate (VYVANSE) 60 MG CAPS       Last Visit  4.6.23    Pharmacy and phone number updated in EPIC:  yes

## 2023-05-02 RX ORDER — LISDEXAMFETAMINE DIMESYLATE 60 MG/1
CAPSULE ORAL
Qty: 30 CAPSULE | Refills: 0 | Status: SHIPPED | OUTPATIENT
Start: 2023-05-02 | End: 2023-05-31

## 2023-05-02 NOTE — TELEPHONE ENCOUNTER
Medication:   Requested Prescriptions     Pending Prescriptions Disp Refills    Lisdexamfetamine Dimesylate (VYVANSE) 60 MG CAPS 30 capsule 0     Si tablet daily        Last Filled:  2023    Patient Phone Number: [phone] (home) [phone] (work)    Last appt: 2023   Next appt: Visit date not found    Last OARRS:   RX Monitoring 8/15/2022   Attestation -   Periodic Controlled Substance Monitoring No signs of potential drug abuse or diversion identified.

## 2023-05-02 NOTE — TELEPHONE ENCOUNTER
PA key :[id number] VYVANSE 60mg    the supplied insurance does not have a pharmacy benefit on it we need insurance information PA pending     Called patient Angel Liter

## 2023-05-03 ENCOUNTER — TELEPHONE (OUTPATIENT)
Dept: ADMINISTRATIVE | Age: 43
End: 2023-05-03

## 2023-05-05 ENCOUNTER — TELEPHONE (OUTPATIENT)
Dept: FAMILY MEDICINE CLINIC | Age: 43
End: 2023-05-05

## 2023-05-05 NOTE — TELEPHONE ENCOUNTER
Patient is calling stating [facility] does not have generic VYVANSE and they dont know of anyone who does carry this? Patient is requesting we try another prior auth for the [facility].. Please advise and see what we can do for patient.

## 2023-05-05 NOTE — TELEPHONE ENCOUNTER
----- Message from Connie Sage sent at 5/5/2023  1:56 PM EDT -----  Subject: Medication Problem    Medication: Lisdexamfetamine Dimesylate (VYVANSE) 60 MG CAPS  Dosage: 1 a day  Ordering Provider: chyna    Question/Problem: Patient needs to know if this prescription will be   called into the pharmacy?       Pharmacy: [facility] [id number] [id number], [address] [place] [address] Daina Davis [phone]    ---------------------------------------------------------------------------  --------------  Jose Juan SPRINGER  [phone]; OK to leave message on voicemail,OK to respond with electronic   message via [employer] portal (only for patients who have registered [employer]   account)  ---------------------------------------------------------------------------  --------------    SCRIPT ANSWERS  Relationship to Patient: Self

## 2023-05-15 DIAGNOSIS — B00.1 COLD SORE: ICD-10-CM

## 2023-05-15 DIAGNOSIS — I10 ESSENTIAL HYPERTENSION: ICD-10-CM

## 2023-05-15 DIAGNOSIS — F41.9 ANXIETY: ICD-10-CM

## 2023-05-15 DIAGNOSIS — F34.1 DYSTHYMIA: ICD-10-CM

## 2023-05-16 ENCOUNTER — TELEPHONE (OUTPATIENT)
Dept: ADMINISTRATIVE | Age: 43
End: 2023-05-16

## 2023-05-16 RX ORDER — VENLAFAXINE HYDROCHLORIDE 75 MG/1
CAPSULE, EXTENDED RELEASE ORAL
Qty: 60 CAPSULE | Refills: 3 | Status: SHIPPED | OUTPATIENT
Start: 2023-05-16

## 2023-05-16 RX ORDER — ALPRAZOLAM 1 MG/1
TABLET ORAL
Qty: 90 TABLET | Refills: 0 | Status: SHIPPED | OUTPATIENT
Start: 2023-05-16 | End: 2023-06-15

## 2023-05-16 RX ORDER — VALACYCLOVIR HYDROCHLORIDE 1 G/1
TABLET, FILM COATED ORAL
Qty: 20 TABLET | Refills: 3 | Status: SHIPPED | OUTPATIENT
Start: 2023-05-16

## 2023-05-16 RX ORDER — HYDROCHLOROTHIAZIDE 12.5 MG/1
TABLET ORAL
Qty: 90 TABLET | Refills: 0 | Status: SHIPPED | OUTPATIENT
Start: 2023-05-16

## 2023-05-16 NOTE — TELEPHONE ENCOUNTER
Medication:   Requested Prescriptions     Pending Prescriptions Disp Refills    valACYclovir (VALTREX) 1 g tablet [Pharmacy Med Name: valACYclovir HCL 1 GRAM TABLET] 20 tablet 3     Sig: TAKE TWO TABLETS BY MOUTH AT SIGN OF BREAKOUT AND REPEAT IN 12 HOURS (FOUR TABLETS TOTAL FOR COLD SORE)    hydroCHLOROthiazide (HYDRODIURIL) 12.5 MG tablet [Pharmacy Med Name: hydroCHLOROthiazide 12.5 MG TABLET] 90 tablet 0     Sig: TAKE ONE TABLET BY MOUTH DAILY    ALPRAZolam (XANAX) 1 MG tablet [Pharmacy Med Name: ALPRAZOLAM 1 MG TABLET] 90 tablet      Sig: TAKE ONE TABLET BY MOUTH THREE TIMES A DAY AS NEEDED FOR ANXIETY    venlafaxine (EFFEXOR XR) 75 MG extended release capsule [Pharmacy Med Name: VENLAFAXINE HCL ER 75 MG CAP] 60 capsule 3     Sig: TAKE TWO CAPSULES BY MOUTH DAILY       Last Filled:8/26/2022      Patient Phone Number: [phone] (home) [phone] (work)    Last appt: 4/6/2023   Next appt: Visit date not found    Lab Results   Component Value Date     07/29/2021    K 4.5 07/29/2021     07/29/2021    CO2 25 07/29/2021    BUN 25 (H) 07/29/2021    CREATININE 1.4 (H) 07/29/2021    GLUCOSE 86 07/29/2021    CALCIUM 11.2 (H) 07/29/2021    PROT 7.2 07/29/2021    LABALBU 4.7 07/29/2021    BILITOT <0.2 07/29/2021    ALKPHOS 50 07/29/2021    AST 22 07/29/2021    ALT 16 07/29/2021    LABGLOM 41 (A) 07/29/2021    GFRAA 50 (A) 07/29/2021    AGRATIO 1.9 07/29/2021    GLOB 2.5 07/29/2021

## 2023-05-24 DIAGNOSIS — E03.9 ACQUIRED HYPOTHYROIDISM: ICD-10-CM

## 2023-05-24 DIAGNOSIS — I10 ESSENTIAL HYPERTENSION: ICD-10-CM

## 2023-05-24 LAB
BASOPHILS # BLD: 0.1 K/UL (ref 0–0.2)
BASOPHILS NFR BLD: 1.2 %
DEPRECATED RDW RBC AUTO: 14.4 % (ref 12.4–15.4)
EOSINOPHIL # BLD: 0.3 K/UL (ref 0–0.6)
EOSINOPHIL NFR BLD: 5.9 %
HCT VFR BLD AUTO: 36.4 % (ref 36–48)
HGB BLD-MCNC: 12.2 G/DL (ref 12–16)
LYMPHOCYTES # BLD: 1.7 K/UL (ref 1–5.1)
LYMPHOCYTES NFR BLD: 30.1 %
MCH RBC QN AUTO: 33.5 PG (ref 26–34)
MCHC RBC AUTO-ENTMCNC: 33.4 G/DL (ref 31–36)
MCV RBC AUTO: 100 FL (ref 80–100)
MONOCYTES # BLD: 0.6 K/UL (ref 0–1.3)
MONOCYTES NFR BLD: 11.4 %
NEUTROPHILS # BLD: 2.9 K/UL (ref 1.7–7.7)
NEUTROPHILS NFR BLD: 51.4 %
PLATELET # BLD AUTO: 249 K/UL (ref 135–450)
PMV BLD AUTO: 8.9 FL (ref 5–10.5)
RBC # BLD AUTO: 3.64 M/UL (ref 4–5.2)
WBC # BLD AUTO: 5.7 K/UL (ref 4–11)

## 2023-05-25 LAB
ALBUMIN SERPL-MCNC: 4.3 G/DL (ref 3.4–5)
ALBUMIN/GLOB SERPL: 1.8 {RATIO} (ref 1.1–2.2)
ALP SERPL-CCNC: 57 U/L (ref 40–129)
ALT SERPL-CCNC: 17 U/L (ref 10–40)
ANION GAP SERPL CALCULATED.3IONS-SCNC: 11 MMOL/L (ref 3–16)
AST SERPL-CCNC: 21 U/L (ref 15–37)
BILIRUB SERPL-MCNC: <0.2 MG/DL (ref 0–1)
BUN SERPL-MCNC: 19 MG/DL (ref 7–20)
CALCIUM SERPL-MCNC: 9.9 MG/DL (ref 8.3–10.6)
CHLORIDE SERPL-SCNC: 103 MMOL/L (ref 99–110)
CHOLEST SERPL-MCNC: 177 MG/DL (ref 0–199)
CO2 SERPL-SCNC: 23 MMOL/L (ref 21–32)
CREAT SERPL-MCNC: 0.9 MG/DL (ref 0.6–1.1)
GFR SERPLBLD CREATININE-BSD FMLA CKD-EPI: >60 ML/MIN/{1.73_M2}
GLUCOSE SERPL-MCNC: 93 MG/DL (ref 70–99)
HDLC SERPL-MCNC: 108 MG/DL (ref 40–60)
LDLC SERPL CALC-MCNC: 57 MG/DL
POTASSIUM SERPL-SCNC: 3.9 MMOL/L (ref 3.5–5.1)
PROT SERPL-MCNC: 6.7 G/DL (ref 6.4–8.2)
SODIUM SERPL-SCNC: 137 MMOL/L (ref 136–145)
TRIGL SERPL-MCNC: 61 MG/DL (ref 0–150)
TSH SERPL DL<=0.005 MIU/L-ACNC: 1.71 UIU/ML (ref 0.27–4.2)
VLDLC SERPL CALC-MCNC: 12 MG/DL

## 2023-06-07 DIAGNOSIS — F98.8 ATTENTION DEFICIT DISORDER, UNSPECIFIED HYPERACTIVITY PRESENCE: ICD-10-CM

## 2023-06-09 RX ORDER — DEXTROAMPHETAMINE SACCHARATE, AMPHETAMINE ASPARTATE MONOHYDRATE, DEXTROAMPHETAMINE SULFATE AND AMPHETAMINE SULFATE 7.5; 7.5; 7.5; 7.5 MG/1; MG/1; MG/1; MG/1
30 CAPSULE, EXTENDED RELEASE ORAL 2 TIMES DAILY
Qty: 60 CAPSULE | Refills: 0 | Status: SHIPPED | OUTPATIENT
Start: 2023-06-09 | End: 2023-07-09

## 2023-06-09 NOTE — TELEPHONE ENCOUNTER
Medication:   Requested Prescriptions     Pending Prescriptions Disp Refills    amphetamine-dextroamphetamine (ADDERALL XR) 30 MG extended release capsule 60 capsule 0     Sig: Take 1 capsule by mouth 2 times daily for 30 days. Last Filled:  5/19/2023    Patient Phone Number: [phone] (home) [phone] (work)    Last appt: 4/6/2023   Next appt: Visit date not found    Last OARRS:   RX Monitoring 8/15/2022   Attestation -   Periodic Controlled Substance Monitoring No signs of potential drug abuse or diversion identified.

## 2023-07-10 DIAGNOSIS — F98.8 ATTENTION DEFICIT DISORDER, UNSPECIFIED HYPERACTIVITY PRESENCE: ICD-10-CM

## 2023-07-11 RX ORDER — DEXTROAMPHETAMINE SACCHARATE, AMPHETAMINE ASPARTATE MONOHYDRATE, DEXTROAMPHETAMINE SULFATE AND AMPHETAMINE SULFATE 7.5; 7.5; 7.5; 7.5 MG/1; MG/1; MG/1; MG/1
30 CAPSULE, EXTENDED RELEASE ORAL 2 TIMES DAILY
Qty: 60 CAPSULE | Refills: 0 | Status: SHIPPED | OUTPATIENT
Start: 2023-07-11 | End: 2023-08-11 | Stop reason: SDUPTHER

## 2023-07-11 NOTE — TELEPHONE ENCOUNTER
Medication:   Requested Prescriptions     Pending Prescriptions Disp Refills    amphetamine-dextroamphetamine (ADDERALL XR) 30 MG extended release capsule 60 capsule 0     Sig: Take 1 capsule by mouth 2 times daily for 30 days. Last appt: 4/6/2023   Next appt: Visit date not found    Last OARRS:   RX Monitoring 8/15/2022   Attestation -   Periodic Controlled Substance Monitoring No signs of potential drug abuse or diversion identified.

## 2023-07-16 DIAGNOSIS — E03.9 ACQUIRED HYPOTHYROIDISM: ICD-10-CM

## 2023-07-17 RX ORDER — LEVOTHYROXINE SODIUM 0.05 MG/1
TABLET ORAL
Qty: 90 TABLET | Refills: 2 | Status: SHIPPED | OUTPATIENT
Start: 2023-07-17

## 2023-07-17 NOTE — TELEPHONE ENCOUNTER
Medication:   Requested Prescriptions     Pending Prescriptions Disp Refills    levothyroxine (SYNTHROID) 50 MCG tablet [Pharmacy Med Name: LEVOTHYROXINE 50 MCG TABLET] 90 tablet 0     Sig: TAKE ONE TABLET BY MOUTH DAILY        Last Filled:  90 X 0 rf 4/24/23    Patient Phone Number: [phone] (home) [phone] (work)    Last appt: 4/6/2023   Next appt: Visit date not found    Last OARRS:   RX Monitoring 7/11/2023   Attestation -   Periodic Controlled Substance Monitoring No signs of potential drug abuse or diversion identified.

## 2023-08-11 DIAGNOSIS — F98.8 ATTENTION DEFICIT DISORDER, UNSPECIFIED HYPERACTIVITY PRESENCE: ICD-10-CM

## 2023-08-11 RX ORDER — DEXTROAMPHETAMINE SACCHARATE, AMPHETAMINE ASPARTATE MONOHYDRATE, DEXTROAMPHETAMINE SULFATE AND AMPHETAMINE SULFATE 7.5; 7.5; 7.5; 7.5 MG/1; MG/1; MG/1; MG/1
30 CAPSULE, EXTENDED RELEASE ORAL 2 TIMES DAILY
Qty: 60 CAPSULE | Refills: 0 | Status: SHIPPED | OUTPATIENT
Start: 2023-08-11 | End: 2023-09-10

## 2023-08-11 NOTE — TELEPHONE ENCOUNTER
----- Message from Wanda Haque sent at 8/11/2023 11:29 AM EDT -----  Subject: Refill Request    QUESTIONS  Name of Medication? amphetamine-dextroamphetamine (ADDERALL XR) 30 MG   extended release capsule  Patient-reported dosage and instructions? 30 mg twice daily   How many days do you have left? 1  Preferred Pharmacy? [address] [facility] [id number]  Pharmacy phone number (if available)? [phone]  ---------------------------------------------------------------------------  --------------  [facility]ING[facility]  What is the best way for the office to contact you? OK to leave message on   voicemail  Preferred Call Back Phone Number? [phone]  ---------------------------------------------------------------------------  --------------  SCRIPT ANSWERS  Relationship to Patient?  Self

## 2023-08-11 NOTE — TELEPHONE ENCOUNTER
Medication:   Requested Prescriptions     Pending Prescriptions Disp Refills    amphetamine-dextroamphetamine (ADDERALL XR) 30 MG extended release capsule 60 capsule 0     Sig: Take 1 capsule by mouth 2 times daily for 30 days. Last appt: 4/6/2023   Next appt: Visit date not found    Last OARRS:   RX Monitoring 7/11/2023   Attestation -   Periodic Controlled Substance Monitoring No signs of potential drug abuse or diversion identified.

## 2023-09-09 DIAGNOSIS — F34.1 DYSTHYMIA: ICD-10-CM

## 2023-09-11 DIAGNOSIS — F41.9 ANXIETY: ICD-10-CM

## 2023-09-11 DIAGNOSIS — F98.8 ATTENTION DEFICIT DISORDER, UNSPECIFIED HYPERACTIVITY PRESENCE: ICD-10-CM

## 2023-09-11 RX ORDER — VENLAFAXINE HYDROCHLORIDE 75 MG/1
CAPSULE, EXTENDED RELEASE ORAL
Qty: 60 CAPSULE | Refills: 0 | Status: SHIPPED | OUTPATIENT
Start: 2023-09-11

## 2023-09-11 RX ORDER — DEXTROAMPHETAMINE SACCHARATE, AMPHETAMINE ASPARTATE MONOHYDRATE, DEXTROAMPHETAMINE SULFATE AND AMPHETAMINE SULFATE 7.5; 7.5; 7.5; 7.5 MG/1; MG/1; MG/1; MG/1
30 CAPSULE, EXTENDED RELEASE ORAL 2 TIMES DAILY
Qty: 60 CAPSULE | Refills: 0 | Status: SHIPPED | OUTPATIENT
Start: 2023-09-11 | End: 2023-10-11

## 2023-09-11 RX ORDER — ALPRAZOLAM 1 MG/1
1 TABLET ORAL 3 TIMES DAILY PRN
Qty: 90 TABLET | Refills: 0 | Status: SHIPPED | OUTPATIENT
Start: 2023-09-11 | End: 2023-10-11

## 2023-09-24 ENCOUNTER — PATIENT MESSAGE (OUTPATIENT)
Dept: FAMILY MEDICINE CLINIC | Age: 43
End: 2023-09-24

## 2023-09-25 RX ORDER — NITROFURANTOIN 25; 75 MG/1; MG/1
100 CAPSULE ORAL 2 TIMES DAILY
Qty: 14 CAPSULE | Refills: 0 | Status: SHIPPED | OUTPATIENT
Start: 2023-09-25 | End: 2023-10-02

## 2023-10-13 DIAGNOSIS — E03.9 ACQUIRED HYPOTHYROIDISM: ICD-10-CM

## 2023-10-13 DIAGNOSIS — R11.0 NAUSEA: ICD-10-CM

## 2023-10-13 DIAGNOSIS — I10 ESSENTIAL HYPERTENSION: ICD-10-CM

## 2023-10-13 DIAGNOSIS — F34.1 DYSTHYMIA: ICD-10-CM

## 2023-10-13 DIAGNOSIS — N94.6 MENSTRUAL CRAMPS: ICD-10-CM

## 2023-10-13 DIAGNOSIS — F98.8 ATTENTION DEFICIT DISORDER, UNSPECIFIED HYPERACTIVITY PRESENCE: ICD-10-CM

## 2023-10-13 RX ORDER — METOPROLOL SUCCINATE 25 MG/1
25 TABLET, EXTENDED RELEASE ORAL DAILY
Qty: 90 TABLET | Refills: 3 | Status: SHIPPED | OUTPATIENT
Start: 2023-10-13

## 2023-10-13 RX ORDER — ONDANSETRON 4 MG/1
4 TABLET, FILM COATED ORAL EVERY 6 HOURS PRN
Qty: 30 TABLET | Refills: 3 | Status: SHIPPED | OUTPATIENT
Start: 2023-10-13

## 2023-10-13 RX ORDER — DEXTROAMPHETAMINE SACCHARATE, AMPHETAMINE ASPARTATE MONOHYDRATE, DEXTROAMPHETAMINE SULFATE AND AMPHETAMINE SULFATE 7.5; 7.5; 7.5; 7.5 MG/1; MG/1; MG/1; MG/1
30 CAPSULE, EXTENDED RELEASE ORAL 2 TIMES DAILY
Qty: 60 CAPSULE | Refills: 0 | Status: SHIPPED | OUTPATIENT
Start: 2023-10-13 | End: 2023-11-12

## 2023-10-13 RX ORDER — LEVOTHYROXINE SODIUM 0.05 MG/1
50 TABLET ORAL DAILY
Qty: 90 TABLET | Refills: 2 | Status: SHIPPED | OUTPATIENT
Start: 2023-10-13

## 2023-10-13 RX ORDER — IBUPROFEN 800 MG/1
TABLET ORAL
Qty: 100 TABLET | Refills: 5 | Status: SHIPPED | OUTPATIENT
Start: 2023-10-13

## 2023-10-13 RX ORDER — HYDROCHLOROTHIAZIDE 12.5 MG/1
TABLET ORAL
Qty: 90 TABLET | Refills: 0 | Status: SHIPPED | OUTPATIENT
Start: 2023-10-13

## 2023-10-13 RX ORDER — AMLODIPINE BESYLATE 5 MG/1
5 TABLET ORAL DAILY
Qty: 90 TABLET | Refills: 1 | Status: SHIPPED | OUTPATIENT
Start: 2023-10-13

## 2023-10-13 RX ORDER — VENLAFAXINE HYDROCHLORIDE 75 MG/1
150 CAPSULE, EXTENDED RELEASE ORAL DAILY
Qty: 60 CAPSULE | Refills: 0 | Status: SHIPPED | OUTPATIENT
Start: 2023-10-13

## 2023-10-13 NOTE — TELEPHONE ENCOUNTER
Medication:   Requested Prescriptions     Pending Prescriptions Disp Refills    amphetamine-dextroamphetamine (ADDERALL XR) 30 MG extended release capsule 60 capsule 0     Sig: Take 1 capsule by mouth 2 times daily for 30 days. 60 x 0 RF 10/13/23  Last Filled:      Patient Phone Number: [phone] (home) [phone] (work)    Last appt: 4/6/2023   Next appt: 10/16/2023    Last OARRS:       9/11/2023     6:05 PM   RX Monitoring   Periodic Controlled Substance Monitoring No signs of potential drug abuse or diversion identified.

## 2023-10-13 NOTE — TELEPHONE ENCOUNTER
Medication:   Requested Prescriptions     Pending Prescriptions Disp Refills    hydroCHLOROthiazide (HYDRODIURIL) 12.5 MG tablet [Pharmacy Med Name: hydroCHLOROthiazide 12.5 MG TABLET] 90 tablet 0     Sig: TAKE ONE TABLET BY MOUTH DAILY        Last Filled:  90 x 0 RF 5/18/23    Patient Phone Number: [phone] (home) [phone] (work)    Last appt: 4/6/2023   Next appt: 10/16/2023    Last OARRS:       9/11/2023     6:05 PM   RX Monitoring   Periodic Controlled Substance Monitoring No signs of potential drug abuse or diversion identified.

## 2023-10-16 ENCOUNTER — OFFICE VISIT (OUTPATIENT)
Dept: FAMILY MEDICINE CLINIC | Age: 43
End: 2023-10-16
Payer: COMMERCIAL

## 2023-10-16 VITALS
BODY MASS INDEX: 22.09 KG/M2 | SYSTOLIC BLOOD PRESSURE: 132 MMHG | DIASTOLIC BLOOD PRESSURE: 76 MMHG | WEIGHT: 117 LBS | HEIGHT: 61 IN

## 2023-10-16 DIAGNOSIS — F98.8 ATTENTION DEFICIT DISORDER, UNSPECIFIED HYPERACTIVITY PRESENCE: ICD-10-CM

## 2023-10-16 DIAGNOSIS — Z23 NEEDS FLU SHOT: ICD-10-CM

## 2023-10-16 DIAGNOSIS — Z79.899 CONTROLLED SUBSTANCE AGREEMENT SIGNED: ICD-10-CM

## 2023-10-16 DIAGNOSIS — I10 ESSENTIAL HYPERTENSION: Primary | ICD-10-CM

## 2023-10-16 DIAGNOSIS — F34.1 DYSTHYMIA: ICD-10-CM

## 2023-10-16 DIAGNOSIS — Z12.11 COLON CANCER SCREENING: ICD-10-CM

## 2023-10-16 DIAGNOSIS — F41.9 ANXIETY: ICD-10-CM

## 2023-10-16 PROCEDURE — 3075F SYST BP GE 130 - 139MM HG: CPT | Performed by: FAMILY MEDICINE

## 2023-10-16 PROCEDURE — 3078F DIAST BP <80 MM HG: CPT | Performed by: FAMILY MEDICINE

## 2023-10-16 PROCEDURE — 99213 OFFICE O/P EST LOW 20 MIN: CPT | Performed by: FAMILY MEDICINE

## 2023-10-16 PROCEDURE — 90674 CCIIV4 VAC NO PRSV 0.5 ML IM: CPT | Performed by: FAMILY MEDICINE

## 2023-10-16 PROCEDURE — 90471 IMMUNIZATION ADMIN: CPT | Performed by: FAMILY MEDICINE

## 2023-10-16 RX ORDER — VENLAFAXINE HYDROCHLORIDE 75 MG/1
150 CAPSULE, EXTENDED RELEASE ORAL DAILY
Qty: 60 CAPSULE | Refills: 5 | Status: SHIPPED | OUTPATIENT
Start: 2023-10-16

## 2023-10-17 LAB
AMPHETAMINES UR QL SCN>1000 NG/ML: POSITIVE
BARBITURATES UR QL SCN>200 NG/ML: ABNORMAL
BENZODIAZ UR QL SCN>200 NG/ML: POSITIVE
CANNABINOIDS UR QL SCN>50 NG/ML: ABNORMAL
COCAINE UR QL SCN: ABNORMAL
DRUG SCREEN COMMENT UR-IMP: ABNORMAL
FENTANYL SCREEN, URINE: ABNORMAL
METHADONE UR QL SCN>300 NG/ML: ABNORMAL
OPIATES UR QL SCN>300 NG/ML: ABNORMAL
OXYCODONE UR QL SCN: ABNORMAL
PCP UR QL SCN>25 NG/ML: ABNORMAL
PH UR STRIP: 6 [PH]

## 2023-11-11 DIAGNOSIS — F41.9 ANXIETY: ICD-10-CM

## 2023-11-11 DIAGNOSIS — R11.0 NAUSEA: ICD-10-CM

## 2023-11-11 DIAGNOSIS — F98.8 ATTENTION DEFICIT DISORDER, UNSPECIFIED HYPERACTIVITY PRESENCE: ICD-10-CM

## 2023-11-13 RX ORDER — ALPRAZOLAM 1 MG/1
1 TABLET ORAL 3 TIMES DAILY PRN
Qty: 90 TABLET | Refills: 1 | Status: SHIPPED | OUTPATIENT
Start: 2023-11-13 | End: 2024-01-12 | Stop reason: SDUPTHER

## 2023-11-13 RX ORDER — DEXTROAMPHETAMINE SACCHARATE, AMPHETAMINE ASPARTATE MONOHYDRATE, DEXTROAMPHETAMINE SULFATE AND AMPHETAMINE SULFATE 7.5; 7.5; 7.5; 7.5 MG/1; MG/1; MG/1; MG/1
30 CAPSULE, EXTENDED RELEASE ORAL 2 TIMES DAILY
Qty: 60 CAPSULE | Refills: 0 | Status: SHIPPED | OUTPATIENT
Start: 2023-11-13 | End: 2023-12-13

## 2023-11-13 RX ORDER — ONDANSETRON 4 MG/1
4 TABLET, FILM COATED ORAL EVERY 6 HOURS PRN
Qty: 30 TABLET | Refills: 3 | Status: SHIPPED | OUTPATIENT
Start: 2023-11-13

## 2023-11-13 NOTE — TELEPHONE ENCOUNTER
Medication:   Requested Prescriptions     Pending Prescriptions Disp Refills    ondansetron (ZOFRAN) 4 MG tablet 30 tablet 3     Sig: Take 1 tablet by mouth every 6 hours as needed for Nausea or Vomiting    amphetamine-dextroamphetamine (ADDERALL XR) 30 MG extended release capsule 60 capsule 0     Sig: Take 1 capsule by mouth 2 times daily for 30 days. Last Filled: 10/13/2023     Patient Phone Number: [phone] (home) [phone] (work)    Last appt: 10/16/2023   Next appt: 11/11/2023    Last OARRS:       10/16/2023    11:47 AM   RX Monitoring   Periodic Controlled Substance Monitoring No signs of potential drug abuse or diversion identified.

## 2023-11-13 NOTE — TELEPHONE ENCOUNTER
Lov 10/16/23  Lrf 90 0 9/11/23 Medication:   Requested Prescriptions     Pending Prescriptions Disp Refills    ALPRAZolam (XANAX) 1 MG tablet 90 tablet 0     Sig: Take 1 tablet by mouth 3 times daily as needed for Anxiety for up to 30 days.        Last Filled:      Patient Phone Number: [phone] (home) [phone] (work)    Last appt: 10/16/2023   Next appt: Visit date not found    Last OARRS:       10/16/2023    11:47 AM   RX Monitoring   Periodic Controlled Substance Monitoring No signs of potential drug abuse or diversion identified.

## 2024-01-12 DIAGNOSIS — F41.9 ANXIETY: ICD-10-CM

## 2024-01-12 DIAGNOSIS — B00.1 COLD SORE: ICD-10-CM

## 2024-01-12 DIAGNOSIS — I10 ESSENTIAL HYPERTENSION: ICD-10-CM

## 2024-01-12 DIAGNOSIS — N94.6 MENSTRUAL CRAMPS: ICD-10-CM

## 2024-01-12 DIAGNOSIS — F98.8 ATTENTION DEFICIT DISORDER, UNSPECIFIED HYPERACTIVITY PRESENCE: ICD-10-CM

## 2024-01-12 RX ORDER — HYDROCHLOROTHIAZIDE 12.5 MG/1
TABLET ORAL
Qty: 90 TABLET | Refills: 2 | Status: SHIPPED | OUTPATIENT
Start: 2024-01-12

## 2024-01-12 RX ORDER — VALACYCLOVIR HYDROCHLORIDE 1 G/1
TABLET, FILM COATED ORAL
Qty: 20 TABLET | Refills: 1 | Status: SHIPPED | OUTPATIENT
Start: 2024-01-12

## 2024-01-12 RX ORDER — ALPRAZOLAM 1 MG/1
1 TABLET ORAL 3 TIMES DAILY PRN
Qty: 90 TABLET | Refills: 0 | Status: SHIPPED | OUTPATIENT
Start: 2024-01-12 | End: 2024-03-12

## 2024-01-12 RX ORDER — IBUPROFEN 800 MG/1
TABLET ORAL
Qty: 100 TABLET | Refills: 5 | Status: SHIPPED | OUTPATIENT
Start: 2024-01-12

## 2024-01-12 RX ORDER — DEXTROAMPHETAMINE SACCHARATE, AMPHETAMINE ASPARTATE MONOHYDRATE, DEXTROAMPHETAMINE SULFATE AND AMPHETAMINE SULFATE 7.5; 7.5; 7.5; 7.5 MG/1; MG/1; MG/1; MG/1
30 CAPSULE, EXTENDED RELEASE ORAL 2 TIMES DAILY
Qty: 60 CAPSULE | Refills: 0 | Status: SHIPPED | OUTPATIENT
Start: 2024-01-12 | End: 2024-02-11

## 2024-01-12 NOTE — TELEPHONE ENCOUNTER
Medication:   Requested Prescriptions     Pending Prescriptions Disp Refills    valACYclovir (VALTREX) 1 g tablet 20 tablet 3    ibuprofen (ADVIL;MOTRIN) 800 MG tablet 100 tablet 5     Si tab prn tid with food    ALPRAZolam (XANAX) 1 MG tablet 90 tablet 1     Sig: Take 1 tablet by mouth 3 times daily as needed for Anxiety for up to 30 days.    amphetamine-dextroamphetamine (ADDERALL XR) 30 MG extended release capsule 60 capsule 0     Sig: Take 1 capsule by mouth 2 times daily for 30 days.        Last Filled:  23    Patient Phone Number: [phone] (home) [phone] (work)    Last appt: 10/16/2023   Next appt: Visit date not found    Last OARRS:       10/16/2023    11:47 AM   RX Monitoring   Periodic Controlled Substance Monitoring No signs of potential drug abuse or diversion identified.

## 2024-02-15 DIAGNOSIS — F41.9 ANXIETY: ICD-10-CM

## 2024-02-15 DIAGNOSIS — F98.8 ATTENTION DEFICIT DISORDER, UNSPECIFIED HYPERACTIVITY PRESENCE: ICD-10-CM

## 2024-02-16 RX ORDER — DEXTROAMPHETAMINE SACCHARATE, AMPHETAMINE ASPARTATE MONOHYDRATE, DEXTROAMPHETAMINE SULFATE AND AMPHETAMINE SULFATE 7.5; 7.5; 7.5; 7.5 MG/1; MG/1; MG/1; MG/1
30 CAPSULE, EXTENDED RELEASE ORAL 2 TIMES DAILY
Qty: 60 CAPSULE | Refills: 0 | Status: SHIPPED | OUTPATIENT
Start: 2024-02-16 | End: 2024-03-17

## 2024-02-16 RX ORDER — ALPRAZOLAM 1 MG/1
1 TABLET ORAL 3 TIMES DAILY PRN
Qty: 90 TABLET | Refills: 0 | Status: SHIPPED | OUTPATIENT
Start: 2024-02-16 | End: 2024-04-16

## 2024-02-16 NOTE — TELEPHONE ENCOUNTER
Medication:   Requested Prescriptions     Pending Prescriptions Disp Refills    ALPRAZolam (XANAX) 1 MG tablet 90 tablet 0     Sig: Take 1 tablet by mouth 3 times daily as needed for Anxiety for up to 60 days. Max Daily Amount: 3 mg    amphetamine-dextroamphetamine (ADDERALL XR) 30 MG extended release capsule 60 capsule 0     Sig: Take 1 capsule by mouth 2 times daily for 30 days.        Last Filled:  Adderall XR 01/12/2024 60 ct 0 refills                          Xanax 01/12/2024 90 ct 0 refills     Patient Phone Number: [phone] (home) [phone] (work)    Last appt: 10/16/2023   Next appt: Visit date not found    Last OARRS:       10/16/2023    11:47 AM   RX Monitoring   Periodic Controlled Substance Monitoring No signs of potential drug abuse or diversion identified.

## 2024-03-18 DIAGNOSIS — F98.8 ATTENTION DEFICIT DISORDER, UNSPECIFIED HYPERACTIVITY PRESENCE: ICD-10-CM

## 2024-03-19 RX ORDER — DEXTROAMPHETAMINE SACCHARATE, AMPHETAMINE ASPARTATE MONOHYDRATE, DEXTROAMPHETAMINE SULFATE AND AMPHETAMINE SULFATE 7.5; 7.5; 7.5; 7.5 MG/1; MG/1; MG/1; MG/1
30 CAPSULE, EXTENDED RELEASE ORAL 2 TIMES DAILY
Qty: 60 CAPSULE | Refills: 0 | Status: SHIPPED | OUTPATIENT
Start: 2024-03-19 | End: 2024-04-18

## 2024-03-19 NOTE — TELEPHONE ENCOUNTER
Medication:   Requested Prescriptions     Pending Prescriptions Disp Refills    amphetamine-dextroamphetamine (ADDERALL XR) 30 MG extended release capsule 60 capsule 0     Sig: Take 1 capsule by mouth 2 times daily for 30 days.      2/16/2024  Last Filled:      Patient Phone Number: [phone] (home) [phone] (work)    Last appt: 10/16/2023   Next appt: 4/8/2024    Last OARRS:       10/16/2023    11:47 AM   RX Monitoring   Periodic Controlled Substance Monitoring No signs of potential drug abuse or diversion identified.

## 2024-04-08 DIAGNOSIS — I10 ESSENTIAL HYPERTENSION: ICD-10-CM

## 2024-04-08 NOTE — TELEPHONE ENCOUNTER
Medication:   Requested Prescriptions     Pending Prescriptions Disp Refills    amLODIPine (NORVASC) 5 MG tablet [Pharmacy Med Name: AMLODIPINE BESYLATE 5 MG TAB] 90 tablet 1     Sig: TAKE 1 TABLET BY MOUTH DAILY       Last Filled:  10/13/2023    Patient Phone Number: [phone] (home) [phone] (work)    Last appt: 10/16/2023   Next appt: 4/15/2024    Lab Results   Component Value Date     05/24/2023    K 3.9 05/24/2023     05/24/2023    CO2 23 05/24/2023    BUN 19 05/24/2023    CREATININE 0.9 05/24/2023    GLUCOSE 93 05/24/2023    CALCIUM 9.9 05/24/2023    PROT 6.7 05/24/2023    LABALBU 4.3 05/24/2023    BILITOT <0.2 05/24/2023    ALKPHOS 57 05/24/2023    AST 21 05/24/2023    ALT 17 05/24/2023    LABGLOM >60 05/24/2023    GFRAA 50 (A) 07/29/2021    AGRATIO 1.8 05/24/2023    GLOB 2.5 07/29/2021

## 2024-04-09 RX ORDER — AMLODIPINE BESYLATE 5 MG/1
5 TABLET ORAL DAILY
Qty: 90 TABLET | Refills: 1 | Status: SHIPPED | OUTPATIENT
Start: 2024-04-09

## 2024-04-15 ENCOUNTER — TELEMEDICINE (OUTPATIENT)
Dept: FAMILY MEDICINE CLINIC | Age: 44
End: 2024-04-15
Payer: COMMERCIAL

## 2024-04-15 DIAGNOSIS — Z12.31 ENCOUNTER FOR SCREENING MAMMOGRAM FOR MALIGNANT NEOPLASM OF BREAST: ICD-10-CM

## 2024-04-15 DIAGNOSIS — F41.9 ANXIETY: ICD-10-CM

## 2024-04-15 DIAGNOSIS — F98.8 ATTENTION DEFICIT DISORDER, UNSPECIFIED HYPERACTIVITY PRESENCE: Primary | ICD-10-CM

## 2024-04-15 DIAGNOSIS — I10 ESSENTIAL HYPERTENSION: ICD-10-CM

## 2024-04-15 PROCEDURE — 99213 OFFICE O/P EST LOW 20 MIN: CPT | Performed by: FAMILY MEDICINE

## 2024-04-15 SDOH — ECONOMIC STABILITY: FOOD INSECURITY: WITHIN THE PAST 12 MONTHS, YOU WORRIED THAT YOUR FOOD WOULD RUN OUT BEFORE YOU GOT MONEY TO BUY MORE.: NEVER TRUE

## 2024-04-15 SDOH — ECONOMIC STABILITY: FOOD INSECURITY: WITHIN THE PAST 12 MONTHS, THE FOOD YOU BOUGHT JUST DIDN'T LAST AND YOU DIDN'T HAVE MONEY TO GET MORE.: NEVER TRUE

## 2024-04-15 SDOH — ECONOMIC STABILITY: HOUSING INSECURITY
IN THE LAST 12 MONTHS, WAS THERE A TIME WHEN YOU DID NOT HAVE A STEADY PLACE TO SLEEP OR SLEPT IN A SHELTER (INCLUDING NOW)?: NO

## 2024-04-15 SDOH — ECONOMIC STABILITY: INCOME INSECURITY: HOW HARD IS IT FOR YOU TO PAY FOR THE VERY BASICS LIKE FOOD, HOUSING, MEDICAL CARE, AND HEATING?: NOT HARD AT ALL

## 2024-04-15 ASSESSMENT — PATIENT HEALTH QUESTIONNAIRE - PHQ9
SUM OF ALL RESPONSES TO PHQ QUESTIONS 1-9: 0
9. THOUGHTS THAT YOU WOULD BE BETTER OFF DEAD, OR OF HURTING YOURSELF: NOT AT ALL
1. LITTLE INTEREST OR PLEASURE IN DOING THINGS: NOT AT ALL
SUM OF ALL RESPONSES TO PHQ9 QUESTIONS 1 & 2: 0
10. IF YOU CHECKED OFF ANY PROBLEMS, HOW DIFFICULT HAVE THESE PROBLEMS MADE IT FOR YOU TO DO YOUR WORK, TAKE CARE OF THINGS AT HOME, OR GET ALONG WITH OTHER PEOPLE: NOT DIFFICULT AT ALL
2. FEELING DOWN, DEPRESSED OR HOPELESS: NOT AT ALL
SUM OF ALL RESPONSES TO PHQ QUESTIONS 1-9: 0
5. POOR APPETITE OR OVEREATING: NOT AT ALL
SUM OF ALL RESPONSES TO PHQ QUESTIONS 1-9: 0
4. FEELING TIRED OR HAVING LITTLE ENERGY: NOT AT ALL
7. TROUBLE CONCENTRATING ON THINGS, SUCH AS READING THE NEWSPAPER OR WATCHING TELEVISION: NOT AT ALL
6. FEELING BAD ABOUT YOURSELF - OR THAT YOU ARE A FAILURE OR HAVE LET YOURSELF OR YOUR FAMILY DOWN: NOT AT ALL
SUM OF ALL RESPONSES TO PHQ QUESTIONS 1-9: 0
8. MOVING OR SPEAKING SO SLOWLY THAT OTHER PEOPLE COULD HAVE NOTICED. OR THE OPPOSITE, BEING SO FIGETY OR RESTLESS THAT YOU HAVE BEEN MOVING AROUND A LOT MORE THAN USUAL: NOT AT ALL
3. TROUBLE FALLING OR STAYING ASLEEP: NOT AT ALL

## 2024-04-15 ASSESSMENT — ENCOUNTER SYMPTOMS
RESPIRATORY NEGATIVE: 1
GASTROINTESTINAL NEGATIVE: 1

## 2024-04-15 NOTE — PROGRESS NOTES
4/15/2024    TELEHEALTH EVALUATION -- Audio/Visual (During COVID-19 public health emergency)    HPI:    Brooke Moyer (:  1980) has requested an audio/video evaluation for the following concern(s):    Hypertension ROS: taking medications as instructed, no medication side effects noted, no TIA's, no chest pain at rest or on exertion, no SOB or dyspnea on exertion, no swelling of ankles or feet.  Exercise:weights and cardio three times a week  New concerns: no new issues.  See lab as noted in review of systems  Home BP: within normal limits      ADD/OARRS: Patient remains on Adderall. She states this works well.     Anxiety/OARRS: Patient also remains on alprazolam although states she usually only takes this at night and oftentimes does not take it.    Review of Systems   Constitutional:  Positive for fatigue.   Respiratory: Negative.     Cardiovascular: Negative.    Gastrointestinal: Negative.    Endocrine: Negative.    Genitourinary: Negative.         Had hormones checked by Dr Lewis and has started po progesterone b ut can tell no difference yet.  She is uncertain whether she had her yearly blood work done at the same time   Musculoskeletal: Negative.    Neurological: Negative.        Prior to Visit Medications    Medication Sig Taking? Authorizing Provider   amLODIPine (NORVASC) 5 MG tablet TAKE 1 TABLET BY MOUTH DAILY Yes Sav Alex MD   amphetamine-dextroamphetamine (ADDERALL XR) 30 MG extended release capsule Take 1 capsule by mouth 2 times daily for 30 days. Yes Sav Alex MD   ALPRAZolam (XANAX) 1 MG tablet Take 1 tablet by mouth 3 times daily as needed for Anxiety for up to 60 days. Max Daily Amount: 3 mg Yes Sav Alex MD   hydroCHLOROthiazide (HYDRODIURIL) 12.5 MG tablet TAKE 1 TABLET BY MOUTH DAILY Yes Sav Alex MD   valACYclovir (VALTREX) 1 g tablet TAKE TWO TABLETS BY MOUTH AT SIGN OF BREAKOUT AND REPEAT IN 12 HOURS (FOUR TABLETS TOTAL FOR COLD SORE) Yes Sav Alex MD

## 2024-04-19 DIAGNOSIS — R11.0 NAUSEA: ICD-10-CM

## 2024-04-19 DIAGNOSIS — B00.1 COLD SORE: ICD-10-CM

## 2024-04-19 DIAGNOSIS — F41.9 ANXIETY: ICD-10-CM

## 2024-04-19 DIAGNOSIS — F98.8 ATTENTION DEFICIT DISORDER, UNSPECIFIED HYPERACTIVITY PRESENCE: ICD-10-CM

## 2024-04-22 RX ORDER — DEXTROAMPHETAMINE SACCHARATE, AMPHETAMINE ASPARTATE MONOHYDRATE, DEXTROAMPHETAMINE SULFATE AND AMPHETAMINE SULFATE 7.5; 7.5; 7.5; 7.5 MG/1; MG/1; MG/1; MG/1
30 CAPSULE, EXTENDED RELEASE ORAL 2 TIMES DAILY
Qty: 60 CAPSULE | Refills: 0 | Status: SHIPPED | OUTPATIENT
Start: 2024-04-22 | End: 2024-05-22

## 2024-04-22 RX ORDER — VALACYCLOVIR HYDROCHLORIDE 1 G/1
TABLET, FILM COATED ORAL
Qty: 20 TABLET | Refills: 1 | Status: SHIPPED | OUTPATIENT
Start: 2024-04-22

## 2024-04-22 RX ORDER — ONDANSETRON 4 MG/1
4 TABLET, FILM COATED ORAL EVERY 6 HOURS PRN
Qty: 30 TABLET | Refills: 3 | Status: SHIPPED | OUTPATIENT
Start: 2024-04-22

## 2024-04-22 RX ORDER — ALPRAZOLAM 1 MG/1
1 TABLET ORAL 3 TIMES DAILY PRN
Qty: 90 TABLET | Refills: 2 | Status: SHIPPED | OUTPATIENT
Start: 2024-04-22 | End: 2024-06-21

## 2024-04-22 NOTE — TELEPHONE ENCOUNTER
Medication:   Requested Prescriptions     Pending Prescriptions Disp Refills    ondansetron (ZOFRAN) 4 MG tablet 30 tablet 3     Sig: Take 1 tablet by mouth every 6 hours as needed for Nausea or Vomiting    valACYclovir (VALTREX) 1 g tablet 20 tablet 1     Sig: TAKE TWO TABLETS BY MOUTH AT SIGN OF BREAKOUT AND REPEAT IN 12 HOURS (FOUR TABLETS TOTAL FOR COLD SORE)    ALPRAZolam (XANAX) 1 MG tablet 90 tablet 0     Sig: Take 1 tablet by mouth 3 times daily as needed for Anxiety for up to 60 days. Max Daily Amount: 3 mg    amphetamine-dextroamphetamine (ADDERALL XR) 30 MG extended release capsule 60 capsule 0     Sig: Take 1 capsule by mouth 2 times daily for 30 days.        Last Filled:  11/13/2023, 30, 3  01/12/2024, 20, 1  02/16/2024, 90, 0  03/19/2024, 60, 0    Patient Phone Number: [phone] (home) [phone] (work)    Last appt: 4/15/2024   Next appt: Visit date not found    Last OARRS:       4/15/2024     4:32 PM   RX Monitoring   Periodic Controlled Substance Monitoring No signs of potential drug abuse or diversion identified.

## 2024-05-14 DIAGNOSIS — F34.1 DYSTHYMIA: ICD-10-CM

## 2024-05-14 RX ORDER — VENLAFAXINE HYDROCHLORIDE 75 MG/1
150 CAPSULE, EXTENDED RELEASE ORAL DAILY
Qty: 60 CAPSULE | Refills: 5 | Status: SHIPPED | OUTPATIENT
Start: 2024-05-14

## 2024-05-14 NOTE — TELEPHONE ENCOUNTER
Medication:   Requested Prescriptions     Pending Prescriptions Disp Refills    venlafaxine (EFFEXOR XR) 75 MG extended release capsule [Pharmacy Med Name: VENLAFAXINE HCL ER 75 MG CAP] 60 capsule 5     Sig: TAKE 2 CAPSULES BY MOUTH DAILY        Last Filled:  10/16/2023, 60, 5    Patient Phone Number: [phone] (home) [phone] (work)    Last appt: 4/15/2024   Next appt: Visit date not found    Last OARRS:       4/15/2024     4:32 PM   RX Monitoring   Periodic Controlled Substance Monitoring No signs of potential drug abuse or diversion identified.

## 2024-05-27 DIAGNOSIS — F98.8 ATTENTION DEFICIT DISORDER, UNSPECIFIED HYPERACTIVITY PRESENCE: ICD-10-CM

## 2024-05-27 DIAGNOSIS — I10 ESSENTIAL HYPERTENSION: ICD-10-CM

## 2024-05-27 DIAGNOSIS — E03.9 ACQUIRED HYPOTHYROIDISM: ICD-10-CM

## 2024-05-27 DIAGNOSIS — N94.6 MENSTRUAL CRAMPS: ICD-10-CM

## 2024-05-28 DIAGNOSIS — E03.9 ACQUIRED HYPOTHYROIDISM: ICD-10-CM

## 2024-05-28 DIAGNOSIS — I10 ESSENTIAL HYPERTENSION: Primary | ICD-10-CM

## 2024-05-28 RX ORDER — METOPROLOL SUCCINATE 25 MG/1
25 TABLET, EXTENDED RELEASE ORAL DAILY
Qty: 90 TABLET | Refills: 3 | Status: SHIPPED | OUTPATIENT
Start: 2024-05-28

## 2024-05-28 RX ORDER — LEVOTHYROXINE SODIUM 0.05 MG/1
50 TABLET ORAL DAILY
Qty: 90 TABLET | Refills: 0 | Status: SHIPPED | OUTPATIENT
Start: 2024-05-28

## 2024-05-28 RX ORDER — DEXTROAMPHETAMINE SACCHARATE, AMPHETAMINE ASPARTATE MONOHYDRATE, DEXTROAMPHETAMINE SULFATE AND AMPHETAMINE SULFATE 7.5; 7.5; 7.5; 7.5 MG/1; MG/1; MG/1; MG/1
30 CAPSULE, EXTENDED RELEASE ORAL 2 TIMES DAILY
Qty: 60 CAPSULE | Refills: 0 | Status: SHIPPED | OUTPATIENT
Start: 2024-05-28 | End: 2024-06-27

## 2024-05-28 RX ORDER — IBUPROFEN 800 MG/1
TABLET ORAL
Qty: 100 TABLET | Refills: 5 | Status: SHIPPED | OUTPATIENT
Start: 2024-05-28

## 2024-05-28 NOTE — TELEPHONE ENCOUNTER
Medication:   Requested Prescriptions     Pending Prescriptions Disp Refills    metoprolol succinate (TOPROL XL) 25 MG extended release tablet 90 tablet 3     Sig: Take 1 tablet by mouth daily    levothyroxine (SYNTHROID) 50 MCG tablet 90 tablet 2     Sig: Take 1 tablet by mouth daily    ibuprofen (ADVIL;MOTRIN) 800 MG tablet 100 tablet 5     Si tab prn tid with food    amphetamine-dextroamphetamine (ADDERALL XR) 30 MG extended release capsule 60 capsule 0     Sig: Take 1 capsule by mouth 2 times daily for 30 days.        Last Filled:  10/13/2023, 90, 3  10/13/2023, 90, 2  , 100, 5  2024, 60, 0    Patient Phone Number: [phone] (home) [phone] (work)    Last appt: 4/15/2024   Next appt: Visit date not found    Last OARRS:       4/15/2024     4:32 PM   RX Monitoring   Periodic Controlled Substance Monitoring No signs of potential drug abuse or diversion identified.

## 2024-05-28 NOTE — TELEPHONE ENCOUNTER
Medication:   Requested Prescriptions     Pending Prescriptions Disp Refills    metoprolol succinate (TOPROL XL) 25 MG extended release tablet [Pharmacy Med Name: METOPROLOL SUCC ER 25 MG TAB] 90 tablet 3     Sig: TAKE ONE TABLET BY MOUTH DAILY    levothyroxine (SYNTHROID) 50 MCG tablet [Pharmacy Med Name: LEVOTHYROXINE 50 MCG TABLET] 90 tablet 2     Sig: TAKE 1 TABLET BY MOUTH DAILY        Last Filled: 10/13/2023, 90, 3  10/13/2023, 90, 3     Patient Phone Number: [phone] (home) [phone] (work)    Last appt: 4/15/2024   Next appt: 5/27/2024    Last OARRS:       4/15/2024     4:32 PM   RX Monitoring   Periodic Controlled Substance Monitoring No signs of potential drug abuse or diversion identified.

## 2024-05-30 RX ORDER — LEVOTHYROXINE SODIUM 0.05 MG/1
50 TABLET ORAL DAILY
Qty: 90 TABLET | Refills: 2 | OUTPATIENT
Start: 2024-05-30

## 2024-05-30 RX ORDER — METOPROLOL SUCCINATE 25 MG/1
25 TABLET, EXTENDED RELEASE ORAL DAILY
Qty: 90 TABLET | Refills: 3 | OUTPATIENT
Start: 2024-05-30

## 2024-07-01 DIAGNOSIS — F41.9 ANXIETY: ICD-10-CM

## 2024-07-01 DIAGNOSIS — B00.1 COLD SORE: ICD-10-CM

## 2024-07-01 RX ORDER — VALACYCLOVIR HYDROCHLORIDE 1 G/1
TABLET, FILM COATED ORAL
Qty: 20 TABLET | Refills: 1 | Status: SHIPPED | OUTPATIENT
Start: 2024-07-01

## 2024-07-01 RX ORDER — ALPRAZOLAM 1 MG/1
1 TABLET ORAL 3 TIMES DAILY PRN
Qty: 90 TABLET | Refills: 0 | Status: SHIPPED | OUTPATIENT
Start: 2024-07-01 | End: 2024-07-31

## 2024-07-01 NOTE — TELEPHONE ENCOUNTER
Medication:   Requested Prescriptions     Pending Prescriptions Disp Refills    valACYclovir (VALTREX) 1 g tablet 20 tablet 1     Sig: TAKE TWO TABLETS BY MOUTH AT SIGN OF BREAKOUT AND REPEAT IN 12 HOURS (FOUR TABLETS TOTAL FOR COLD SORE)    ALPRAZolam (XANAX) 1 MG tablet 90 tablet 2     Sig: Take 1 tablet by mouth 3 times daily as needed for Anxiety for up to 60 days. Max Daily Amount: 3 mg        Last Filled:      Patient Phone Number: [phone] (home) [phone] (work)    Last appt: 4/15/2024   Next appt: Visit date not found    Last OARRS:       4/15/2024     4:32 PM   RX Monitoring   Periodic Controlled Substance Monitoring No signs of potential drug abuse or diversion identified.

## 2024-07-31 DIAGNOSIS — F98.8 ATTENTION DEFICIT DISORDER, UNSPECIFIED HYPERACTIVITY PRESENCE: ICD-10-CM

## 2024-07-31 RX ORDER — DEXTROAMPHETAMINE SACCHARATE, AMPHETAMINE ASPARTATE MONOHYDRATE, DEXTROAMPHETAMINE SULFATE AND AMPHETAMINE SULFATE 7.5; 7.5; 7.5; 7.5 MG/1; MG/1; MG/1; MG/1
30 CAPSULE, EXTENDED RELEASE ORAL 2 TIMES DAILY
Qty: 60 CAPSULE | Refills: 0 | Status: SHIPPED | OUTPATIENT
Start: 2024-07-31 | End: 2024-08-30

## 2024-08-25 DIAGNOSIS — E03.9 ACQUIRED HYPOTHYROIDISM: ICD-10-CM

## 2024-08-26 RX ORDER — LEVOTHYROXINE SODIUM 50 UG/1
50 TABLET ORAL DAILY
Qty: 90 TABLET | Refills: 0 | Status: SHIPPED | OUTPATIENT
Start: 2024-08-26

## 2024-08-26 NOTE — TELEPHONE ENCOUNTER
Medication:   Requested Prescriptions     Pending Prescriptions Disp Refills    levothyroxine (SYNTHROID) 50 MCG tablet [Pharmacy Med Name: LEVOTHYROXINE 50 MCG TABLET] 90 tablet 0     Sig: TAKE 1 TABLET BY MOUTH DAILY        Last Filled:  05/28/2024, 90, 0    Patient Phone Number: [phone] (home) [phone] (work)    Last appt: 4/15/2024   Next appt: Visit date not found    Last OARRS:       7/1/2024     6:03 PM   RX Monitoring   Periodic Controlled Substance Monitoring No signs of potential drug abuse or diversion identified.

## 2024-08-29 DIAGNOSIS — E03.9 ACQUIRED HYPOTHYROIDISM: ICD-10-CM

## 2024-08-29 DIAGNOSIS — I10 ESSENTIAL HYPERTENSION: ICD-10-CM

## 2024-08-29 LAB
ALBUMIN SERPL-MCNC: 4.1 G/DL (ref 3.4–5)
ALBUMIN/GLOB SERPL: 1.9 {RATIO} (ref 1.1–2.2)
ALP SERPL-CCNC: 50 U/L (ref 40–129)
ALT SERPL-CCNC: 35 U/L (ref 10–40)
ANION GAP SERPL CALCULATED.3IONS-SCNC: 9 MMOL/L (ref 3–16)
AST SERPL-CCNC: 37 U/L (ref 15–37)
BASOPHILS # BLD: 0.1 K/UL (ref 0–0.2)
BASOPHILS NFR BLD: 0.9 %
BILIRUB SERPL-MCNC: 0.5 MG/DL (ref 0–1)
BUN SERPL-MCNC: 16 MG/DL (ref 7–20)
CALCIUM SERPL-MCNC: 9.9 MG/DL (ref 8.3–10.6)
CHLORIDE SERPL-SCNC: 99 MMOL/L (ref 99–110)
CHOLEST SERPL-MCNC: 191 MG/DL (ref 0–199)
CO2 SERPL-SCNC: 26 MMOL/L (ref 21–32)
CREAT SERPL-MCNC: 0.9 MG/DL (ref 0.6–1.1)
DEPRECATED RDW RBC AUTO: 15 % (ref 12.4–15.4)
EOSINOPHIL # BLD: 0.4 K/UL (ref 0–0.6)
EOSINOPHIL NFR BLD: 6.4 %
GFR SERPLBLD CREATININE-BSD FMLA CKD-EPI: 80 ML/MIN/{1.73_M2}
GLUCOSE SERPL-MCNC: 74 MG/DL (ref 70–99)
HCT VFR BLD AUTO: 39.1 % (ref 36–48)
HDLC SERPL-MCNC: 116 MG/DL (ref 40–60)
HGB BLD-MCNC: 13.5 G/DL (ref 12–16)
LDLC SERPL CALC-MCNC: 63 MG/DL
LYMPHOCYTES # BLD: 0.5 K/UL (ref 1–5.1)
LYMPHOCYTES NFR BLD: 7.5 %
MCH RBC QN AUTO: 34.8 PG (ref 26–34)
MCHC RBC AUTO-ENTMCNC: 34.4 G/DL (ref 31–36)
MCV RBC AUTO: 101.1 FL (ref 80–100)
MONOCYTES # BLD: 0.6 K/UL (ref 0–1.3)
MONOCYTES NFR BLD: 8.4 %
NEUTROPHILS # BLD: 5.2 K/UL (ref 1.7–7.7)
NEUTROPHILS NFR BLD: 76.8 %
PLATELET # BLD AUTO: 228 K/UL (ref 135–450)
PMV BLD AUTO: 8.7 FL (ref 5–10.5)
POTASSIUM SERPL-SCNC: 4.2 MMOL/L (ref 3.5–5.1)
PROT SERPL-MCNC: 6.3 G/DL (ref 6.4–8.2)
RBC # BLD AUTO: 3.87 M/UL (ref 4–5.2)
SODIUM SERPL-SCNC: 134 MMOL/L (ref 136–145)
TRIGL SERPL-MCNC: 61 MG/DL (ref 0–150)
TSH SERPL DL<=0.005 MIU/L-ACNC: 2.1 UIU/ML (ref 0.27–4.2)
VLDLC SERPL CALC-MCNC: 12 MG/DL
WBC # BLD AUTO: 6.7 K/UL (ref 4–11)

## 2024-08-30 DIAGNOSIS — R11.0 NAUSEA: ICD-10-CM

## 2024-08-30 DIAGNOSIS — N94.6 MENSTRUAL CRAMPS: ICD-10-CM

## 2024-08-30 DIAGNOSIS — F98.8 ATTENTION DEFICIT DISORDER, UNSPECIFIED HYPERACTIVITY PRESENCE: ICD-10-CM

## 2024-08-30 DIAGNOSIS — F41.9 ANXIETY: ICD-10-CM

## 2024-08-30 NOTE — TELEPHONE ENCOUNTER
Medication:   Requested Prescriptions     Pending Prescriptions Disp Refills    ondansetron (ZOFRAN) 4 MG tablet 30 tablet 3     Sig: Take 1 tablet by mouth every 6 hours as needed for Nausea or Vomiting    ibuprofen (ADVIL;MOTRIN) 800 MG tablet 100 tablet 5     Si tab prn tid with food    ALPRAZolam (XANAX) 1 MG tablet 90 tablet 0     Sig: Take 1 tablet by mouth 3 times daily as needed for Anxiety for up to 30 days. Max Daily Amount: 3 mg    amphetamine-dextroamphetamine (ADDERALL XR) 30 MG extended release capsule 60 capsule 0     Sig: Take 1 capsule by mouth 2 times daily for 30 days.        Last Filled:  lov 04.15.2024    Patient Phone Number: [phone] (home) [phone] (work)    Last appt: 4/15/2024   Next appt: Visit date not found    Last OARRS:       2024     6:03 PM   RX Monitoring   Periodic Controlled Substance Monitoring No signs of potential drug abuse or diversion identified.

## 2024-08-31 RX ORDER — ONDANSETRON 4 MG/1
4 TABLET, FILM COATED ORAL EVERY 6 HOURS PRN
Qty: 30 TABLET | Refills: 3 | Status: SHIPPED | OUTPATIENT
Start: 2024-08-31

## 2024-08-31 RX ORDER — ALPRAZOLAM 1 MG
1 TABLET ORAL 3 TIMES DAILY PRN
Qty: 90 TABLET | Refills: 0 | Status: SHIPPED | OUTPATIENT
Start: 2024-08-31 | End: 2024-09-30

## 2024-08-31 RX ORDER — IBUPROFEN 800 MG/1
TABLET, FILM COATED ORAL
Qty: 100 TABLET | Refills: 1 | Status: SHIPPED | OUTPATIENT
Start: 2024-08-31

## 2024-08-31 RX ORDER — DEXTROAMPHETAMINE SACCHARATE, AMPHETAMINE ASPARTATE MONOHYDRATE, DEXTROAMPHETAMINE SULFATE AND AMPHETAMINE SULFATE 7.5; 7.5; 7.5; 7.5 MG/1; MG/1; MG/1; MG/1
30 CAPSULE, EXTENDED RELEASE ORAL 2 TIMES DAILY
Qty: 60 CAPSULE | Refills: 0 | Status: SHIPPED | OUTPATIENT
Start: 2024-08-31 | End: 2024-09-30

## 2024-10-14 NOTE — TELEPHONE ENCOUNTER
Lov- 4/6/2023  [facility]- 5/16/2023
Detail Level: Zone
Detail Level: Detailed
Detail Level: Simple

## 2024-10-19 DIAGNOSIS — I10 ESSENTIAL HYPERTENSION: ICD-10-CM

## 2024-10-19 DIAGNOSIS — F98.8 ATTENTION DEFICIT DISORDER, UNSPECIFIED HYPERACTIVITY PRESENCE: ICD-10-CM

## 2024-10-19 RX ORDER — DEXTROAMPHETAMINE SACCHARATE, AMPHETAMINE ASPARTATE MONOHYDRATE, DEXTROAMPHETAMINE SULFATE AND AMPHETAMINE SULFATE 7.5; 7.5; 7.5; 7.5 MG/1; MG/1; MG/1; MG/1
30 CAPSULE, EXTENDED RELEASE ORAL 2 TIMES DAILY
Qty: 60 CAPSULE | Refills: 0 | Status: CANCELLED | OUTPATIENT
Start: 2024-10-19 | End: 2024-11-18

## 2024-10-19 NOTE — TELEPHONE ENCOUNTER
Medication:   Requested Prescriptions     Pending Prescriptions Disp Refills    amLODIPine (NORVASC) 5 MG tablet [Pharmacy Med Name: AMLODIPINE BESYLATE 5 MG TAB] 90 tablet 1     Sig: TAKE 1 TABLET BY MOUTH DAILY    hydroCHLOROthiazide 12.5 MG tablet [Pharmacy Med Name: hydroCHLOROthiazide 12.5 MG TABLET] 90 tablet 2     Sig: TAKE 1 TABLET BY MOUTH DAILY       Last Filled:  4/9/2024, 90, 1  1/12/2024, 90, 2    Patient Phone Number: [phone] (home) [phone] (work)    Last appt: 4/15/2024   Next appt: 10/28/2024    Lab Results   Component Value Date     (L) 08/29/2024    K 4.2 08/29/2024    CL 99 08/29/2024    CO2 26 08/29/2024    BUN 16 08/29/2024    CREATININE 0.9 08/29/2024    GLUCOSE 74 08/29/2024    CALCIUM 9.9 08/29/2024    BILITOT 0.5 08/29/2024    ALKPHOS 50 08/29/2024    AST 37 08/29/2024    ALT 35 08/29/2024    LABGLOM 80 08/29/2024    GFRAA 50 (A) 07/29/2021    AGRATIO 1.9 08/29/2024    GLOB 2.5 07/29/2021

## 2024-10-20 RX ORDER — AMLODIPINE BESYLATE 5 MG/1
5 TABLET ORAL DAILY
Qty: 90 TABLET | Refills: 1 | Status: SHIPPED | OUTPATIENT
Start: 2024-10-20

## 2024-10-20 RX ORDER — HYDROCHLOROTHIAZIDE 12.5 MG/1
TABLET ORAL
Qty: 90 TABLET | Refills: 1 | Status: SHIPPED | OUTPATIENT
Start: 2024-10-20

## 2024-10-21 DIAGNOSIS — I10 ESSENTIAL HYPERTENSION: ICD-10-CM

## 2024-10-21 DIAGNOSIS — R41.840 ATTENTION OR CONCENTRATION DEFICIT: ICD-10-CM

## 2024-10-21 RX ORDER — DEXTROAMPHETAMINE SACCHARATE, AMPHETAMINE ASPARTATE MONOHYDRATE, DEXTROAMPHETAMINE SULFATE AND AMPHETAMINE SULFATE 7.5; 7.5; 7.5; 7.5 MG/1; MG/1; MG/1; MG/1
30 CAPSULE, EXTENDED RELEASE ORAL 2 TIMES DAILY
Qty: 60 CAPSULE | Refills: 0 | Status: SHIPPED | OUTPATIENT
Start: 2024-10-21 | End: 2024-11-20

## 2024-10-21 RX ORDER — HYDROCHLOROTHIAZIDE 12.5 MG/1
TABLET ORAL
Qty: 90 TABLET | Refills: 0 | Status: SHIPPED | OUTPATIENT
Start: 2024-10-21

## 2024-10-21 NOTE — TELEPHONE ENCOUNTER
Medication:   Requested Prescriptions     Pending Prescriptions Disp Refills    hydroCHLOROthiazide 12.5 MG tablet 90 tablet 2     Sig: TAKE 1 TABLET BY MOUTH DAILY    amphetamine-dextroamphetamine (ADDERALL XR) 30 MG extended release capsule 60 capsule 0     Sig: Take 1 capsule by mouth 2 times daily for 30 days.        Last Filled:  01/12/2024, 90, 2  08/31/2024, 60, 0      Patient Phone Number: [phone] (home) [phone] (work)    Last appt: 4/15/2024   Next appt: 10/28/2024    Last OARRS:       7/1/2024     6:03 PM   RX Monitoring   Periodic Controlled Substance Monitoring No signs of potential drug abuse or diversion identified.

## 2024-10-22 RX ORDER — HYDROCHLOROTHIAZIDE 12.5 MG/1
TABLET ORAL
Qty: 90 TABLET | Refills: 2 | OUTPATIENT
Start: 2024-10-22

## 2024-11-12 DIAGNOSIS — F34.1 DYSTHYMIA: ICD-10-CM

## 2024-11-12 RX ORDER — VENLAFAXINE HYDROCHLORIDE 75 MG/1
150 CAPSULE, EXTENDED RELEASE ORAL DAILY
Qty: 180 CAPSULE | Refills: 1 | Status: SHIPPED | OUTPATIENT
Start: 2024-11-12

## 2024-11-12 NOTE — TELEPHONE ENCOUNTER
Medication:   Requested Prescriptions     Pending Prescriptions Disp Refills    venlafaxine (EFFEXOR XR) 75 MG extended release capsule [Pharmacy Med Name: VENLAFAXINE HCL ER 75 MG CAP] 180 capsule      Sig: TAKE 2 CAPSULES BY MOUTH DAILY        Last Filled:  05/14/2024    Patient Phone Number: [phone] (home) [phone] (work)    Last appt: 4/15/2024   Next appt: 12/23/2024    Last OARRS:       7/1/2024     6:03 PM   RX Monitoring   Periodic Controlled Substance Monitoring No signs of potential drug abuse or diversion identified.

## 2024-11-22 DIAGNOSIS — E03.9 ACQUIRED HYPOTHYROIDISM: ICD-10-CM

## 2024-11-22 RX ORDER — LEVOTHYROXINE SODIUM 50 UG/1
50 TABLET ORAL DAILY
Qty: 90 TABLET | Refills: 0 | Status: SHIPPED | OUTPATIENT
Start: 2024-11-22

## 2024-11-22 NOTE — TELEPHONE ENCOUNTER
Medication:   Requested Prescriptions     Pending Prescriptions Disp Refills    levothyroxine (SYNTHROID) 50 MCG tablet [Pharmacy Med Name: LEVOTHYROXINE 50 MCG TABLET] 90 tablet 0     Sig: TAKE 1 TABLET BY MOUTH DAILY        Last Filled:  08/26/2024, 90, 0    Patient Phone Number: [phone] (home) [phone] (work)    Last appt: 4/15/2024   Next appt: 12/23/2024    Last OARRS:       7/1/2024     6:03 PM   RX Monitoring   Periodic Controlled Substance Monitoring No signs of potential drug abuse or diversion identified.

## 2024-11-23 DIAGNOSIS — R41.840 ATTENTION OR CONCENTRATION DEFICIT: ICD-10-CM

## 2024-11-23 DIAGNOSIS — B00.1 COLD SORE: ICD-10-CM

## 2024-11-23 DIAGNOSIS — F41.9 ANXIETY: ICD-10-CM

## 2024-11-23 DIAGNOSIS — I10 ESSENTIAL HYPERTENSION: ICD-10-CM

## 2024-11-23 DIAGNOSIS — E03.9 ACQUIRED HYPOTHYROIDISM: ICD-10-CM

## 2024-11-23 DIAGNOSIS — N94.6 MENSTRUAL CRAMPS: ICD-10-CM

## 2024-11-25 RX ORDER — IBUPROFEN 800 MG/1
TABLET, FILM COATED ORAL
Qty: 100 TABLET | Refills: 0 | Status: SHIPPED | OUTPATIENT
Start: 2024-11-25

## 2024-11-25 RX ORDER — LEVOTHYROXINE SODIUM 50 UG/1
50 TABLET ORAL DAILY
Qty: 90 TABLET | Refills: 0 | Status: SHIPPED | OUTPATIENT
Start: 2024-11-25

## 2024-11-25 RX ORDER — DEXTROAMPHETAMINE SACCHARATE, AMPHETAMINE ASPARTATE MONOHYDRATE, DEXTROAMPHETAMINE SULFATE AND AMPHETAMINE SULFATE 7.5; 7.5; 7.5; 7.5 MG/1; MG/1; MG/1; MG/1
30 CAPSULE, EXTENDED RELEASE ORAL 2 TIMES DAILY
Qty: 60 CAPSULE | Refills: 0 | Status: SHIPPED | OUTPATIENT
Start: 2024-11-25 | End: 2024-12-25

## 2024-11-25 RX ORDER — HYDROCHLOROTHIAZIDE 12.5 MG/1
TABLET ORAL
Qty: 90 TABLET | Refills: 0 | Status: SHIPPED | OUTPATIENT
Start: 2024-11-25

## 2024-11-25 RX ORDER — VALACYCLOVIR HYDROCHLORIDE 1 G/1
TABLET, FILM COATED ORAL
Qty: 20 TABLET | Refills: 1 | Status: SHIPPED | OUTPATIENT
Start: 2024-11-25

## 2024-11-25 RX ORDER — ALPRAZOLAM 1 MG/1
1 TABLET ORAL 3 TIMES DAILY PRN
Qty: 90 TABLET | Refills: 0 | Status: SHIPPED | OUTPATIENT
Start: 2024-11-25 | End: 2024-12-25

## 2024-11-25 NOTE — TELEPHONE ENCOUNTER
Medication:   Requested Prescriptions     Pending Prescriptions Disp Refills    valACYclovir (VALTREX) 1 g tablet 20 tablet 1     Sig: TAKE TWO TABLETS BY MOUTH AT SIGN OF BREAKOUT AND REPEAT IN 12 HOURS (FOUR TABLETS TOTAL FOR COLD SORE)    ibuprofen (ADVIL;MOTRIN) 800 MG tablet 100 tablet 1     Si tab prn tid with food    ALPRAZolam (XANAX) 1 MG tablet 90 tablet 0     Sig: Take 1 tablet by mouth 3 times daily as needed for Anxiety for up to 30 days. Max Daily Amount: 3 mg    amphetamine-dextroamphetamine (ADDERALL XR) 30 MG extended release capsule 60 capsule 0     Sig: Take 1 capsule by mouth 2 times daily for 30 days.    hydroCHLOROthiazide 12.5 MG tablet 90 tablet 0     Sig: TAKE 1 TABLET BY MOUTH DAILY    levothyroxine (SYNTHROID) 50 MCG tablet 90 tablet 0     Sig: Take 1 tablet by mouth daily       Last Filled:  2024 2024 10/21/2024    Patient Phone Number: [phone] (home) [phone] (work)    Last appt: 4/15/2024   Next appt: 2024    Lab Results   Component Value Date     (L) 2024    K 4.2 2024    CL 99 2024    CO2 26 2024    BUN 16 2024    CREATININE 0.9 2024    GLUCOSE 74 2024    CALCIUM 9.9 2024    BILITOT 0.5 2024    ALKPHOS 50 2024    AST 37 2024    ALT 35 2024    LABGLOM 80 2024    GFRAA 50 (A) 2021    AGRATIO 1.9 2024    GLOB 2.5 2021

## 2024-12-23 ENCOUNTER — OFFICE VISIT (OUTPATIENT)
Dept: FAMILY MEDICINE CLINIC | Age: 44
End: 2024-12-23

## 2024-12-23 VITALS
SYSTOLIC BLOOD PRESSURE: 110 MMHG | WEIGHT: 112 LBS | BODY MASS INDEX: 21.14 KG/M2 | DIASTOLIC BLOOD PRESSURE: 70 MMHG | OXYGEN SATURATION: 98 % | HEIGHT: 61 IN | HEART RATE: 70 BPM

## 2024-12-23 DIAGNOSIS — R41.840 ATTENTION OR CONCENTRATION DEFICIT: ICD-10-CM

## 2024-12-23 DIAGNOSIS — J02.9 SORE THROAT: ICD-10-CM

## 2024-12-23 DIAGNOSIS — F41.9 ANXIETY: ICD-10-CM

## 2024-12-23 DIAGNOSIS — I10 ESSENTIAL HYPERTENSION: Primary | ICD-10-CM

## 2024-12-23 RX ORDER — DEXTROAMPHETAMINE SACCHARATE, AMPHETAMINE ASPARTATE MONOHYDRATE, DEXTROAMPHETAMINE SULFATE AND AMPHETAMINE SULFATE 7.5; 7.5; 7.5; 7.5 MG/1; MG/1; MG/1; MG/1
30 CAPSULE, EXTENDED RELEASE ORAL 2 TIMES DAILY
Qty: 60 CAPSULE | Refills: 0 | Status: SHIPPED | OUTPATIENT
Start: 2024-12-23 | End: 2025-01-22

## 2024-12-23 RX ORDER — AMOXICILLIN 500 MG/1
500 CAPSULE ORAL 3 TIMES DAILY
Qty: 30 CAPSULE | Refills: 0 | Status: SHIPPED | OUTPATIENT
Start: 2024-12-23 | End: 2025-01-02

## 2024-12-23 RX ORDER — ALPRAZOLAM 1 MG/1
1 TABLET ORAL 3 TIMES DAILY PRN
Qty: 90 TABLET | Refills: 2 | Status: SHIPPED | OUTPATIENT
Start: 2024-12-23 | End: 2025-03-23

## 2024-12-23 NOTE — PROGRESS NOTES
Subjective:   Brooke Moyer is a 44 y.o. female with hypertension.  Hypertension ROS: taking medications as instructed, no medication side effects noted, no TIA's, no chest pain at rest or on exertion, no SOB or dyspnea on exertion, no swelling of ankles or feet.  Exercise:weights and cardio three times a week  New concerns: mild sore throat today  Home BP: not checking     ADD/OARRS: Patient remains on Adderall.  She states this works well.     Anxiety/OARRS: Patient also remains on alprazolam although states she usually only takes this at night and oftentimes does not take it.    Outpatient Medications Marked as Taking for the 12/23/24 encounter (Office Visit) with Sav Alex MD   Medication Sig Dispense Refill    ALPRAZolam (XANAX) 1 MG tablet Take 1 tablet by mouth 3 times daily as needed for Anxiety for up to 90 days. Max Daily Amount: 3 mg 90 tablet 2    amphetamine-dextroamphetamine (ADDERALL XR) 30 MG extended release capsule Take 1 capsule by mouth 2 times daily for 30 days. 60 capsule 0    amoxicillin (AMOXIL) 500 MG capsule Take 1 capsule by mouth 3 times daily for 10 days 30 capsule 0    valACYclovir (VALTREX) 1 g tablet TAKE TWO TABLETS BY MOUTH AT SIGN OF BREAKOUT AND REPEAT IN 12 HOURS (FOUR TABLETS TOTAL FOR COLD SORE) 20 tablet 1    ibuprofen (ADVIL;MOTRIN) 800 MG tablet 1 tab prn tid with food 100 tablet 0    hydroCHLOROthiazide 12.5 MG tablet TAKE 1 TABLET BY MOUTH DAILY 90 tablet 0    levothyroxine (SYNTHROID) 50 MCG tablet Take 1 tablet by mouth daily 90 tablet 0    venlafaxine (EFFEXOR XR) 75 MG extended release capsule TAKE 2 CAPSULES BY MOUTH DAILY 180 capsule 1    amLODIPine (NORVASC) 5 MG tablet TAKE 1 TABLET BY MOUTH DAILY 90 tablet 1    ondansetron (ZOFRAN) 4 MG tablet Take 1 tablet by mouth every 6 hours as needed for Nausea or Vomiting 30 tablet 3    metoprolol succinate (TOPROL XL) 25 MG extended release tablet Take 1 tablet by mouth daily 90 tablet 3    nystatin-triamcinolone

## 2025-02-10 DIAGNOSIS — R41.840 ATTENTION OR CONCENTRATION DEFICIT: ICD-10-CM

## 2025-02-10 DIAGNOSIS — E03.9 ACQUIRED HYPOTHYROIDISM: ICD-10-CM

## 2025-02-10 DIAGNOSIS — F34.1 DYSTHYMIA: ICD-10-CM

## 2025-02-10 DIAGNOSIS — I10 ESSENTIAL HYPERTENSION: ICD-10-CM

## 2025-02-10 RX ORDER — HYDROCHLOROTHIAZIDE 12.5 MG/1
TABLET ORAL
Qty: 90 TABLET | Refills: 0 | Status: SHIPPED | OUTPATIENT
Start: 2025-02-10

## 2025-02-10 RX ORDER — METOPROLOL SUCCINATE 25 MG/1
25 TABLET, EXTENDED RELEASE ORAL DAILY
Qty: 90 TABLET | Refills: 3 | Status: SHIPPED | OUTPATIENT
Start: 2025-02-10

## 2025-02-10 RX ORDER — LEVOTHYROXINE SODIUM 50 UG/1
50 TABLET ORAL DAILY
Qty: 90 TABLET | Refills: 0 | Status: SHIPPED | OUTPATIENT
Start: 2025-02-10

## 2025-02-10 RX ORDER — DEXTROAMPHETAMINE SACCHARATE, AMPHETAMINE ASPARTATE MONOHYDRATE, DEXTROAMPHETAMINE SULFATE AND AMPHETAMINE SULFATE 7.5; 7.5; 7.5; 7.5 MG/1; MG/1; MG/1; MG/1
30 CAPSULE, EXTENDED RELEASE ORAL 2 TIMES DAILY
Qty: 60 CAPSULE | Refills: 0 | Status: SHIPPED | OUTPATIENT
Start: 2025-02-10 | End: 2025-03-12

## 2025-02-10 RX ORDER — VENLAFAXINE HYDROCHLORIDE 75 MG/1
150 CAPSULE, EXTENDED RELEASE ORAL DAILY
Qty: 180 CAPSULE | Refills: 1 | Status: SHIPPED | OUTPATIENT
Start: 2025-02-10

## 2025-02-10 NOTE — TELEPHONE ENCOUNTER
Medication:   Requested Prescriptions     Pending Prescriptions Disp Refills    metoprolol succinate (TOPROL XL) 25 MG extended release tablet 90 tablet 3     Sig: Take 1 tablet by mouth daily    venlafaxine (EFFEXOR XR) 75 MG extended release capsule 180 capsule 1     Sig: Take 2 capsules by mouth daily    hydroCHLOROthiazide 12.5 MG tablet 90 tablet 0     Sig: TAKE 1 TABLET BY MOUTH DAILY    levothyroxine (SYNTHROID) 50 MCG tablet 90 tablet 0     Sig: Take 1 tablet by mouth daily    amphetamine-dextroamphetamine (ADDERALL XR) 30 MG extended release capsule 60 capsule 0     Sig: Take 1 capsule by mouth 2 times daily for 30 days.        Last Filled:  11/25/24    Patient Phone Number: [phone] (home) [phone] (work)    Last appt: 12/23/2024   Next appt: Visit date not found    Last OARRS:       7/1/2024     6:03 PM   RX Monitoring   Periodic Controlled Substance Monitoring No signs of potential drug abuse or diversion identified.

## 2025-03-18 DIAGNOSIS — R41.840 ATTENTION OR CONCENTRATION DEFICIT: ICD-10-CM

## 2025-03-19 RX ORDER — DEXTROAMPHETAMINE SACCHARATE, AMPHETAMINE ASPARTATE MONOHYDRATE, DEXTROAMPHETAMINE SULFATE AND AMPHETAMINE SULFATE 7.5; 7.5; 7.5; 7.5 MG/1; MG/1; MG/1; MG/1
30 CAPSULE, EXTENDED RELEASE ORAL 2 TIMES DAILY
Qty: 60 CAPSULE | Refills: 0 | Status: SHIPPED | OUTPATIENT
Start: 2025-03-19 | End: 2025-04-18

## 2025-03-19 NOTE — TELEPHONE ENCOUNTER
Medication:   Requested Prescriptions     Pending Prescriptions Disp Refills    amphetamine-dextroamphetamine (ADDERALL XR) 30 MG extended release capsule 60 capsule 0     Sig: Take 1 capsule by mouth 2 times daily for 30 days.        Last Filled:  02/10/2025    Patient Phone Number: [phone] (home) [phone] (work)    Last appt: 12/23/2024   Next appt: Visit date not found    Last OARRS:       7/1/2024     6:03 PM   RX Monitoring   Periodic Controlled Substance Monitoring No signs of potential drug abuse or diversion identified.

## 2025-04-16 DIAGNOSIS — R41.840 ATTENTION OR CONCENTRATION DEFICIT: ICD-10-CM

## 2025-04-17 RX ORDER — DEXTROAMPHETAMINE SACCHARATE, AMPHETAMINE ASPARTATE MONOHYDRATE, DEXTROAMPHETAMINE SULFATE AND AMPHETAMINE SULFATE 7.5; 7.5; 7.5; 7.5 MG/1; MG/1; MG/1; MG/1
30 CAPSULE, EXTENDED RELEASE ORAL 2 TIMES DAILY
Qty: 60 CAPSULE | Refills: 0 | Status: SHIPPED | OUTPATIENT
Start: 2025-04-17 | End: 2025-05-17

## 2025-04-17 NOTE — TELEPHONE ENCOUNTER
Medication:   Requested Prescriptions     Pending Prescriptions Disp Refills    amphetamine-dextroamphetamine (ADDERALL XR) 30 MG extended release capsule 60 capsule 0     Sig: Take 1 capsule by mouth 2 times daily for 30 days.        Last Filled:  3/19/25    Patient Phone Number: [phone] (home) [phone] (work)    Last appt: 12/23/2024   Next appt: Visit date not found    Last OARRS:       7/1/2024     6:03 PM   RX Monitoring   Periodic Controlled Substance Monitoring No signs of potential drug abuse or diversion identified.

## 2025-05-12 DIAGNOSIS — E03.9 ACQUIRED HYPOTHYROIDISM: ICD-10-CM

## 2025-05-12 DIAGNOSIS — I10 ESSENTIAL HYPERTENSION: ICD-10-CM

## 2025-05-12 RX ORDER — HYDROCHLOROTHIAZIDE 12.5 MG/1
12.5 TABLET ORAL DAILY
Qty: 90 TABLET | Refills: 0 | Status: SHIPPED | OUTPATIENT
Start: 2025-05-12

## 2025-05-12 RX ORDER — AMLODIPINE BESYLATE 5 MG/1
5 TABLET ORAL DAILY
Qty: 90 TABLET | Refills: 1 | Status: SHIPPED | OUTPATIENT
Start: 2025-05-12

## 2025-05-12 RX ORDER — LEVOTHYROXINE SODIUM 50 UG/1
50 TABLET ORAL DAILY
Qty: 90 TABLET | Refills: 0 | Status: SHIPPED | OUTPATIENT
Start: 2025-05-12

## 2025-05-12 NOTE — TELEPHONE ENCOUNTER
Medication:   Requested Prescriptions     Pending Prescriptions Disp Refills    hydroCHLOROthiazide 12.5 MG tablet [Pharmacy Med Name: hydroCHLOROthiazide 12.5 MG TABLET] 90 tablet 0     Sig: TAKE 1 TABLET BY MOUTH DAILY       Last Filled:  2/10/25    Patient Phone Number: [phone] (home) [phone] (work)    Last appt: 12/23/2024   Next appt: Visit date not found    Lab Results   Component Value Date     (L) 08/29/2024    K 4.2 08/29/2024    CL 99 08/29/2024    CO2 26 08/29/2024    BUN 16 08/29/2024    CREATININE 0.9 08/29/2024    GLUCOSE 74 08/29/2024    CALCIUM 9.9 08/29/2024    BILITOT 0.5 08/29/2024    ALKPHOS 50 08/29/2024    AST 37 08/29/2024    ALT 35 08/29/2024    LABGLOM 80 08/29/2024    GFRAA 50 (A) 07/29/2021    AGRATIO 1.9 08/29/2024    GLOB 2.5 07/29/2021

## 2025-05-12 NOTE — TELEPHONE ENCOUNTER
Last OV: 12/23/2024  Next OV: Visit date not found    Next appointment due:    Last fill:10/20/2024  Refills:90/1  Medication:   Requested Prescriptions     Pending Prescriptions Disp Refills    amLODIPine (NORVASC) 5 MG tablet 90 tablet 1     Sig: Take 1 tablet by mouth daily       Last Filled:      Patient Phone Number: [phone] (home) [phone] (work)    Last appt: 12/23/2024   Next appt: Visit date not found    Lab Results   Component Value Date     (L) 08/29/2024    K 4.2 08/29/2024    CL 99 08/29/2024    CO2 26 08/29/2024    BUN 16 08/29/2024    CREATININE 0.9 08/29/2024    GLUCOSE 74 08/29/2024    CALCIUM 9.9 08/29/2024    BILITOT 0.5 08/29/2024    ALKPHOS 50 08/29/2024    AST 37 08/29/2024    ALT 35 08/29/2024    LABGLOM 80 08/29/2024    GFRAA 50 (A) 07/29/2021    AGRATIO 1.9 08/29/2024    GLOB 2.5 07/29/2021

## 2025-05-12 NOTE — TELEPHONE ENCOUNTER
Medication:   Requested Prescriptions     Pending Prescriptions Disp Refills    levothyroxine (SYNTHROID) 50 MCG tablet [Pharmacy Med Name: LEVOTHYROXINE 50 MCG TABLET] 90 tablet 0     Sig: TAKE 1 TABLET BY MOUTH DAILY       Last Filled:  2/10/25    Patient Phone Number: [phone] (home) [phone] (work)    Last appt: 12/23/2024   Next appt: Visit date not found    Last Lipid:   Lab Results   Component Value Date/Time    CHOL 191 08/29/2024 10:37 AM    TRIG 61 08/29/2024 10:37 AM     08/29/2024 10:37 AM

## 2025-05-20 DIAGNOSIS — N94.6 MENSTRUAL CRAMPS: ICD-10-CM

## 2025-05-20 DIAGNOSIS — R41.840 ATTENTION OR CONCENTRATION DEFICIT: ICD-10-CM

## 2025-05-20 DIAGNOSIS — R11.0 NAUSEA: ICD-10-CM

## 2025-05-21 RX ORDER — ONDANSETRON 4 MG/1
4 TABLET, FILM COATED ORAL EVERY 6 HOURS PRN
Qty: 30 TABLET | Refills: 3 | Status: SHIPPED | OUTPATIENT
Start: 2025-05-21

## 2025-05-21 RX ORDER — DEXTROAMPHETAMINE SACCHARATE, AMPHETAMINE ASPARTATE MONOHYDRATE, DEXTROAMPHETAMINE SULFATE AND AMPHETAMINE SULFATE 7.5; 7.5; 7.5; 7.5 MG/1; MG/1; MG/1; MG/1
30 CAPSULE, EXTENDED RELEASE ORAL 2 TIMES DAILY
Qty: 60 CAPSULE | Refills: 0 | OUTPATIENT
Start: 2025-05-21 | End: 2025-06-20

## 2025-05-21 RX ORDER — IBUPROFEN 800 MG/1
TABLET, FILM COATED ORAL
Qty: 100 TABLET | Refills: 0 | Status: SHIPPED | OUTPATIENT
Start: 2025-05-21

## 2025-05-21 NOTE — TELEPHONE ENCOUNTER
Patient not seen in office recently. Will not prescribe medication until patient able to schedule and appointment. Once appointment is scheduled, can refill the medication.

## 2025-05-21 NOTE — TELEPHONE ENCOUNTER
Medication:   Requested Prescriptions     Pending Prescriptions Disp Refills    ondansetron (ZOFRAN) 4 MG tablet 30 tablet 3     Sig: Take 1 tablet by mouth every 6 hours as needed for Nausea or Vomiting    ibuprofen (ADVIL;MOTRIN) 800 MG tablet 100 tablet 0     Si tab prn tid with food    amphetamine-dextroamphetamine (ADDERALL XR) 30 MG extended release capsule 60 capsule 0     Sig: Take 1 capsule by mouth 2 times daily for 30 days.        Last Filled:  25    Patient Phone Number: [phone] (home) [phone] (work)    Last appt: 2024   Next appt: Visit date not found    Last OARRS:       2024     6:03 PM   RX Monitoring   Periodic Controlled Substance Monitoring No signs of potential drug abuse or diversion identified.

## 2025-06-04 DIAGNOSIS — F41.9 ANXIETY: ICD-10-CM

## 2025-06-04 DIAGNOSIS — R41.840 ATTENTION OR CONCENTRATION DEFICIT: ICD-10-CM

## 2025-06-04 RX ORDER — DEXTROAMPHETAMINE SACCHARATE, AMPHETAMINE ASPARTATE MONOHYDRATE, DEXTROAMPHETAMINE SULFATE AND AMPHETAMINE SULFATE 7.5; 7.5; 7.5; 7.5 MG/1; MG/1; MG/1; MG/1
30 CAPSULE, EXTENDED RELEASE ORAL 2 TIMES DAILY
Qty: 60 CAPSULE | Refills: 0 | Status: SHIPPED | OUTPATIENT
Start: 2025-06-04 | End: 2025-07-04

## 2025-06-04 RX ORDER — ALPRAZOLAM 1 MG/1
1 TABLET ORAL 3 TIMES DAILY PRN
Qty: 90 TABLET | Refills: 2 | Status: SHIPPED | OUTPATIENT
Start: 2025-06-04 | End: 2025-09-02

## 2025-06-04 SDOH — HEALTH STABILITY: PHYSICAL HEALTH: ON AVERAGE, HOW MANY MINUTES DO YOU ENGAGE IN EXERCISE AT THIS LEVEL?: 60 MIN

## 2025-06-04 SDOH — HEALTH STABILITY: PHYSICAL HEALTH: ON AVERAGE, HOW MANY DAYS PER WEEK DO YOU ENGAGE IN MODERATE TO STRENUOUS EXERCISE (LIKE A BRISK WALK)?: 3 DAYS

## 2025-06-04 NOTE — TELEPHONE ENCOUNTER
The patient needs an appointment for further refill she has been sent a message    Medication:   Requested Prescriptions     Pending Prescriptions Disp Refills    ALPRAZolam (XANAX) 1 MG tablet 90 tablet 2     Sig: Take 1 tablet by mouth 3 times daily as needed for Anxiety for up to 90 days. Max Daily Amount: 3 mg    amphetamine-dextroamphetamine (ADDERALL XR) 30 MG extended release capsule 60 capsule 0     Sig: Take 1 capsule by mouth 2 times daily for 30 days.        Last Filled: 4/17/2025     Patient Phone Number: [phone] (home) [phone] (work)    Last appt: 12/23/2024   Next appt: 6/5/2025    Last OARRS:       7/1/2024     6:03 PM   RX Monitoring   Periodic Controlled Substance Monitoring No signs of potential drug abuse or diversion identified.

## 2025-06-04 NOTE — PROGRESS NOTES
Brooke Moyer is a 45 y.o. year old female here for:  Chief Complaint:   Chief Complaint   Patient presents with    New Patient         ASSESSMENT & PLAN:  1. Anxiety  Assessment & Plan:   Chronic, at goal (stable), continue current treatment plan. Patient understanding R/B associated with benzodiazepine.   2. Attention or concentration deficit  Assessment & Plan:   Chronic, at goal (stable), continue current treatment plan  3. Essential hypertension  Assessment & Plan:   Chronic, at goal (stable), continue current treatment plan with possible DC metoprolol as patient tends to not take this regardless. Will trial no BB and can fully DC at next appointment if BP remains controlled.       Return in about 3 months (around 9/5/2025) for Routine check and labs.     Reviewed all pertinent previous records, including lab work and imaging. Encouraged patient to call back with any question/concerns.  Return/ED precautions discussed, all questions/concerns answered and patient verbalized understanding/approval of treatment plan.   ePtr Dumont MD    Subjective:  HPI:  Patient is a pleasant 45 y.o. year-old female with history significant for A/D, ADHD, HTN  presenting to [facility] for follow-up chronic conditions.    Follows with OB/GYN for routine well woman care. Is up to date with PAP testing. Mammogram has not yet been completed though patient understanding need to have this done.     Mood, sleep and appetite stable with medications. Anxiety well controlled. Patient verbalized understanding risk associated with the controlled substances as well as the recommendations/protocol to follow-up every 3 months.    Compliant with medications for chronic conditions and tolerates without ADR. No recent change reported in terms of medications and/or medical conditions.     No other concerns at this time    Past Medical History:   Diagnosis Date    ADD (attention deficit disorder with hyperactivity)     Anxiety     HPV

## 2025-06-05 ENCOUNTER — OFFICE VISIT (OUTPATIENT)
Dept: FAMILY MEDICINE CLINIC | Age: 45
End: 2025-06-05

## 2025-06-05 VITALS
WEIGHT: 113 LBS | HEART RATE: 90 BPM | SYSTOLIC BLOOD PRESSURE: 122 MMHG | OXYGEN SATURATION: 98 % | DIASTOLIC BLOOD PRESSURE: 80 MMHG | BODY MASS INDEX: 21.35 KG/M2

## 2025-06-05 DIAGNOSIS — R41.840 ATTENTION OR CONCENTRATION DEFICIT: ICD-10-CM

## 2025-06-05 DIAGNOSIS — I10 ESSENTIAL HYPERTENSION: ICD-10-CM

## 2025-06-05 DIAGNOSIS — F41.9 ANXIETY: Primary | ICD-10-CM

## 2025-06-05 SDOH — ECONOMIC STABILITY: FOOD INSECURITY: WITHIN THE PAST 12 MONTHS, YOU WORRIED THAT YOUR FOOD WOULD RUN OUT BEFORE YOU GOT MONEY TO BUY MORE.: NEVER TRUE

## 2025-06-05 SDOH — ECONOMIC STABILITY: FOOD INSECURITY: WITHIN THE PAST 12 MONTHS, THE FOOD YOU BOUGHT JUST DIDN'T LAST AND YOU DIDN'T HAVE MONEY TO GET MORE.: NEVER TRUE

## 2025-06-05 ASSESSMENT — PATIENT HEALTH QUESTIONNAIRE - PHQ9
SUM OF ALL RESPONSES TO PHQ QUESTIONS 1-9: 0
SUM OF ALL RESPONSES TO PHQ QUESTIONS 1-9: 0
2. FEELING DOWN, DEPRESSED OR HOPELESS: NOT AT ALL
1. LITTLE INTEREST OR PLEASURE IN DOING THINGS: NOT AT ALL
SUM OF ALL RESPONSES TO PHQ QUESTIONS 1-9: 0
SUM OF ALL RESPONSES TO PHQ QUESTIONS 1-9: 0

## 2025-06-05 NOTE — ASSESSMENT & PLAN NOTE
Chronic, at goal (stable), continue current treatment plan with possible DC metoprolol as patient tends to not take this regardless. Will trial no BB and can fully DC at next appointment if BP remains controlled.

## 2025-06-05 NOTE — ASSESSMENT & PLAN NOTE
Chronic, at goal (stable), continue current treatment plan. Patient understanding R/B associated with benzodiazepine.

## 2025-07-04 DIAGNOSIS — B00.1 COLD SORE: ICD-10-CM

## 2025-07-04 DIAGNOSIS — R41.840 ATTENTION OR CONCENTRATION DEFICIT: ICD-10-CM

## 2025-07-08 RX ORDER — DEXTROAMPHETAMINE SACCHARATE, AMPHETAMINE ASPARTATE MONOHYDRATE, DEXTROAMPHETAMINE SULFATE AND AMPHETAMINE SULFATE 7.5; 7.5; 7.5; 7.5 MG/1; MG/1; MG/1; MG/1
30 CAPSULE, EXTENDED RELEASE ORAL 2 TIMES DAILY
Qty: 60 CAPSULE | Refills: 0 | Status: SHIPPED | OUTPATIENT
Start: 2025-07-08 | End: 2025-08-07

## 2025-07-08 RX ORDER — VALACYCLOVIR HYDROCHLORIDE 1 G/1
TABLET, FILM COATED ORAL
Qty: 20 TABLET | Refills: 1 | Status: SHIPPED | OUTPATIENT
Start: 2025-07-08

## 2025-08-11 DIAGNOSIS — I10 ESSENTIAL HYPERTENSION: ICD-10-CM

## 2025-08-11 DIAGNOSIS — R41.840 ATTENTION OR CONCENTRATION DEFICIT: ICD-10-CM

## 2025-08-11 DIAGNOSIS — E03.9 ACQUIRED HYPOTHYROIDISM: ICD-10-CM

## 2025-08-12 DIAGNOSIS — I10 ESSENTIAL HYPERTENSION: ICD-10-CM

## 2025-08-12 RX ORDER — LEVOTHYROXINE SODIUM 50 UG/1
50 TABLET ORAL DAILY
Qty: 90 TABLET | Refills: 0 | Status: SHIPPED | OUTPATIENT
Start: 2025-08-12

## 2025-08-12 RX ORDER — HYDROCHLOROTHIAZIDE 12.5 MG/1
12.5 TABLET ORAL DAILY
Qty: 30 TABLET | Refills: 0 | Status: SHIPPED | OUTPATIENT
Start: 2025-08-12 | End: 2025-09-11

## 2025-08-12 RX ORDER — HYDROCHLOROTHIAZIDE 12.5 MG/1
12.5 TABLET ORAL DAILY
Qty: 90 TABLET | Refills: 0 | OUTPATIENT
Start: 2025-08-12

## 2025-08-12 RX ORDER — DEXTROAMPHETAMINE SACCHARATE, AMPHETAMINE ASPARTATE MONOHYDRATE, DEXTROAMPHETAMINE SULFATE AND AMPHETAMINE SULFATE 7.5; 7.5; 7.5; 7.5 MG/1; MG/1; MG/1; MG/1
30 CAPSULE, EXTENDED RELEASE ORAL 2 TIMES DAILY
Qty: 60 CAPSULE | Refills: 0 | Status: SHIPPED | OUTPATIENT
Start: 2025-08-12 | End: 2025-09-11